# Patient Record
Sex: MALE | Race: WHITE
[De-identification: names, ages, dates, MRNs, and addresses within clinical notes are randomized per-mention and may not be internally consistent; named-entity substitution may affect disease eponyms.]

---

## 2017-12-11 NOTE — CT
CT ANGIOGRAM ABDOMEN WITH AND WITHOUT IV CONTRAST AND 3D RECONSTRUCTIONS:

 

Date: 12-11-17

 

History: Follow up abdominal aortic aneurysm. 

 

Comparison: None available. 

 

FINDINGS: 

Vascular calcifications are seen in the visualized coronary arteries with evidence of prior CABG. Vas
cular calcifications also seen in the abdominal aorta and involving the iliac arteries. There is an i
nfrarenal abdominal aortic aneurysm with greatest axial dimensions of 6.5 cm AP and 5.7 cm transverse
. The craniocaudal dimensions of the aneurysm is approximately 6.4 cm. There is essentric plaque with
in the aneurysm. The aneurysm extends to just above the level of the aortic bifurcation and does not 
involving the iliac arteries. There is no evidence of an aortic dissection. 

 

There are two patent right renal arteries with single patent left renal artery. The celiac and superi
or mesenteric arteries are patent with very mild narrowing at the origin of the celiac artery related
 to an essentric atherosclerotic calcification. 

 

There is bibasilar atelectasis. 

 

The liver, spleen, pancreas, bilateral adrenal glands and kidneys demonstrate a normal CT appearance 
for arterial phase of imaging. 

 

No periaortic fluid collection is seen. 

 

Degenerative changes are noted in the spine. 

 

IMPRESSION: 

Infrarenal abdominal aortic aneurysm with maximal AP dimension of 6.5 cm. This aneurysm extends to th
e distal infrarenal abdominal aorta but does not involve the iliac arteries. Diameter of the abdomina
l aorta at the level of the renal arteries is 2.8 cm. 

 

POS: [facility]

## 2018-01-30 NOTE — OP
DATE OF PROCEDURE:  01/30/2018

 

PREOPERATIVE DIAGNOSIS:  Abdominal aortic aneurysm with 2 large accessory renal emanating from the cuba
dy of the aneurysm.

 

POSTOPERATIVE DIAGNOSIS:  Abdominal aortic aneurysm with 2 large accessory renal emanating from the b
ulysses of the aneurysm.

 

PROCEDURES:

1.  Abdominal aortogram.

2.  Selective superior left accessory renal artery angiogram.

3.  Selective inferior left renal artery angiogram.

4.  Embolization of the left superior renal artery with 4 x 8 interlock coils x2.

5.  Selective embolization of the left inferior renal artery with 5 x 80 interlock coils x2.

5.  ProGlide closure.

6.  Ultrasound guided arterial access.

 

TOTAL CONTRAST:  101 mL

 

FLUORO TIME:  29.8 minutes.

 

ANESTHESIA:  1 mg of Versed and 25 mcg of fentanyl for IV sedation.

 

DESCRIPTION OF PROCEDURE:  After operative consent was obtained, the patient was brought to the cath 
lab and placed in supine position on the cath lab table.  Appropriate anesthetic monitor was placed. 
 IV sedation was begun.  The right groin was prepped and draped in the usual sterile fashion.  Using 
ultrasound guidance, the area over the femoral artery was anesthetized with 1% lidocaine.  Percutaneo
us access to the common femoral artery was obtained using ultrasound guidance.  A 5 Welsh sheath was
 placed.  The pigtail catheter was placed in the abdominal aorta.  Multiple plane views of the abdomi
nal aorta were performed using both hand injected technique and a power injector.  Once the two acces
josy renal arteries were localized and an appropriate angled view of the x-ray had obtained, the pigt
ail catheter was removed over an angled Glidewire.  A LIMA catheter was then used to cannulate the davidson
perior accessory renal artery.  The Glidewire past into the renal artery.  The LIMA catheter was exch
anged for a Canton catheter.  Hand injected arteriogram was performed through the Canton and confir
gerard the intraluminal location.  A renegade microcatheter was then passed into the Canton catheter a
nd through that two 4 x 80 interlock coils were placed.  Hand injected arteriogram performed good pos
itioning.  The angled Glidewire was then replaced.  The LIMA catheter was then used to cannulate the 
inferior accessory renal artery.  Similarly, the similar to the superior accessory renal, the angled 
Glidewire was passed deep into the accessory renal artery.  Canton catheter was then placed and hand
 injected arteriogram performed confirming intraluminal location.  Microcatheter was then placed and 
through the microcatheter two 5 x 80 interlock coils were placed.  Followup aortogram showed good pos
itioning of both the coils with the superior accessory renal already being thrombosed.  The Bentson g
uidewire was then replaced and ProGlide was deployed and good hemostasis.  The patient was transferre
d to the recovery room, will be kept for two hours prior to being discharged home today.  The aneurys
m will need to be repaired in the near future.

## 2018-02-15 ENCOUNTER — HOSPITAL ENCOUNTER (INPATIENT)
Dept: HOSPITAL 92 - SURG A | Age: 78
LOS: 1 days | Discharge: HOME | DRG: 269 | End: 2018-02-16
Attending: THORACIC SURGERY (CARDIOTHORACIC VASCULAR SURGERY) | Admitting: THORACIC SURGERY (CARDIOTHORACIC VASCULAR SURGERY)
Payer: MEDICARE

## 2018-02-15 VITALS — BODY MASS INDEX: 34 KG/M2

## 2018-02-15 VITALS — SYSTOLIC BLOOD PRESSURE: 138 MMHG | DIASTOLIC BLOOD PRESSURE: 63 MMHG

## 2018-02-15 DIAGNOSIS — I10: ICD-10-CM

## 2018-02-15 DIAGNOSIS — I71.4: Primary | ICD-10-CM

## 2018-02-15 DIAGNOSIS — K21.9: ICD-10-CM

## 2018-02-15 DIAGNOSIS — Z95.1: ICD-10-CM

## 2018-02-15 DIAGNOSIS — E78.00: ICD-10-CM

## 2018-02-15 DIAGNOSIS — I25.10: ICD-10-CM

## 2018-02-15 LAB
ANION GAP SERPL CALC-SCNC: 11 MMOL/L (ref 10–20)
BUN SERPL-MCNC: 26 MG/DL (ref 8.4–25.7)
CALCIUM SERPL-MCNC: 8.9 MG/DL (ref 7.8–10.44)
CHLORIDE SERPL-SCNC: 108 MMOL/L (ref 98–107)
CO2 SERPL-SCNC: 27 MMOL/L (ref 23–31)
CREAT CL PREDICTED SERPL C-G-VRATE: 121 ML/MIN (ref 70–130)
GLUCOSE SERPL-MCNC: 124 MG/DL (ref 83–110)
HGB BLD-MCNC: 12.6 G/DL (ref 14–18)
MCH RBC QN AUTO: 32.6 PG (ref 27–31)
MCV RBC AUTO: 95.5 FL (ref 80–94)
PLATELET # BLD AUTO: 205 THOU/UL (ref 130–400)
POTASSIUM SERPL-SCNC: 3.7 MMOL/L (ref 3.5–5.1)
RBC # BLD AUTO: 3.87 MILL/UL (ref 4.7–6.1)
SODIUM SERPL-SCNC: 142 MMOL/L (ref 136–145)
WBC # BLD AUTO: 7 THOU/UL (ref 4.8–10.8)

## 2018-02-15 PROCEDURE — 86922 COMPATIBILITY TEST ANTIGLOB: CPT

## 2018-02-15 PROCEDURE — 04V03D6: ICD-10-PCS | Performed by: THORACIC SURGERY (CARDIOTHORACIC VASCULAR SURGERY)

## 2018-02-15 PROCEDURE — C1760 CLOSURE DEV, VASC: HCPCS

## 2018-02-15 PROCEDURE — C1726 CATH, BAL DIL, NON-VASCULAR: HCPCS

## 2018-02-15 PROCEDURE — 86900 BLOOD TYPING SEROLOGIC ABO: CPT

## 2018-02-15 PROCEDURE — 80048 BASIC METABOLIC PNL TOTAL CA: CPT

## 2018-02-15 PROCEDURE — 85025 COMPLETE CBC W/AUTO DIFF WBC: CPT

## 2018-02-15 PROCEDURE — 36415 COLL VENOUS BLD VENIPUNCTURE: CPT

## 2018-02-15 PROCEDURE — C1894 INTRO/SHEATH, NON-LASER: HCPCS

## 2018-02-15 PROCEDURE — 86870 RBC ANTIBODY IDENTIFICATION: CPT

## 2018-02-15 PROCEDURE — C1769 GUIDE WIRE: HCPCS

## 2018-02-15 PROCEDURE — 86850 RBC ANTIBODY SCREEN: CPT

## 2018-02-15 PROCEDURE — 85027 COMPLETE CBC AUTOMATED: CPT

## 2018-02-15 PROCEDURE — 76001: CPT

## 2018-02-15 PROCEDURE — 86901 BLOOD TYPING SEROLOGIC RH(D): CPT

## 2018-02-15 RX ADMIN — Medication SCH GM: at 15:29

## 2018-02-15 RX ADMIN — Medication SCH GM: at 23:55

## 2018-02-15 NOTE — OP
DATE OF PROCEDURE:  02/15/2018

 

PREOPERATIVE DIAGNOSIS:  Abdominal aortic aneurysm.

 

POSTOPERATIVE DIAGNOSIS:  Abdominal aortic aneurysm.

 

PROCEDURE:

1.  Preclose technique using bilateral crossed Proglides x2.

2.  Ultrasound guided percutaneous access.

3.  Endovascular aneurysm repair with Med[employer] Endurant II system 

   32 x 16 x 166 bifurcation graft through the left groin 

   16 x 16 x 93 right iliac extension

   16 x 16 x 82 right iliac extension

 

SURGEON:  Dr. Vinay Coleman and Dr. Mode Muhammad

 

ANESTHESIA:  General endotracheal.

 

ESTIMATED BLOOD LOSS:  Less than 100 mL.

 

TOTAL CONTRAST:  90 mL.

 

TOTAL FLUOROSCOPY TIME:  11 minutes 25 seconds.

 

DESCRIPTION OF PROCEDURE:  After operative consent was obtained, the patient 
was brought to the operating room and placed in the supine position on the 
operating room table.  Appropriate anesthetic monitor was placed and general 
endotracheal anesthesia induced.  Groins were prepped and draped in usual 
sterile fashion.  Using ultrasound guidance, percutaneous access to the common 
femoral arteries was obtained.  A 5 Egyptian sheath was placed over a Bentson 
wire.  A crossed ProGlide closure sutures were then placed and clamped 
bilaterally.  The patient was given 7500 units of heparin followed by 2500 
units later in the case to maintain an ACT of over 230.

 

On the left, the Bentson guidewire was exchanged for a Lunderquist guidewire.  
On the right, the Bentson guidewire was exchanged for a Lunderquist guidewire 
and a 12-Egyptian sheath placed in the aneurysm itself.  The Contra catheter was 
then placed in the upper abdominal aorta.  Aortogram was performed illuminating 
the aorta and renals.  Our graft was selected, which was a 32 x 16 x 166.  This 
was loaded on the left side of the Lunderquist graft, taken up to the level of 
the renal arteries and deployment begun.

 

A second magnified aortogram was performed illuminating the renal arteries.  
The graft was positioned appropriately just at the renal bifurcation and 
deployed down to the short gate.  The Contra catheter was withdrawn back into 
the sheath and the sheath brought back underneath the short gate.  Contra 
catheter and Lunderquist wire were used to cannulate the short gate.  
Lunderquist was brought up into the upper abdominal aorta.  The Contra catheter 
was brought into the graft itself and twirled.  It twirled freely.  Contra 
catheter was then used to measure with hand injected right iliac angiogram.  
The iliac bifurcation was noted and marked.  A 16 x 16 x 93 extension graft was 
selected and deployed in the right iliac.  This ended up approximately 2 cm 
short of our otto and we extended it with a second 16 x 16 graft.  Reliant 
balloons were used to fully deploy the graft for its full length.  Aortogram 
was again performed showing no evidence of type 1 endoleak.  The graft material 
itself was intact.  There was late type 2 endoleak from lumbar arteries.  The 
previously embolized accessory renals were thrombosed.  Lunderquist guidewires 
were exchanged for Bentson guidewires. 



Sheaths were removed and Proglides deployed with good hemostasis. Heparin was 
reversed with 75 mg of Protamine.  Dermabond was applied to the skin and the 
patient was awakened, extubated, and transferred to recovery room in stable 
condition.

 

DRISS

## 2018-02-16 VITALS — TEMPERATURE: 97.9 F

## 2018-02-16 LAB
ANION GAP SERPL CALC-SCNC: 12 MMOL/L (ref 10–20)
BASOPHILS # BLD AUTO: 0 THOU/UL (ref 0–0.2)
BASOPHILS NFR BLD AUTO: 0.1 % (ref 0–1)
BUN SERPL-MCNC: 18 MG/DL (ref 8.4–25.7)
CALCIUM SERPL-MCNC: 9 MG/DL (ref 7.8–10.44)
CHLORIDE SERPL-SCNC: 106 MMOL/L (ref 98–107)
CO2 SERPL-SCNC: 23 MMOL/L (ref 23–31)
CREAT CL PREDICTED SERPL C-G-VRATE: 137 ML/MIN (ref 70–130)
EOSINOPHIL # BLD AUTO: 0 THOU/UL (ref 0–0.7)
EOSINOPHIL NFR BLD AUTO: 0.3 % (ref 0–10)
GLUCOSE SERPL-MCNC: 140 MG/DL (ref 83–110)
HGB BLD-MCNC: 12.2 G/DL (ref 14–18)
LYMPHOCYTES # BLD: 0.9 THOU/UL (ref 1.2–3.4)
LYMPHOCYTES NFR BLD AUTO: 7.8 % (ref 21–51)
MCH RBC QN AUTO: 31.8 PG (ref 27–31)
MCV RBC AUTO: 94.6 FL (ref 80–94)
MONOCYTES # BLD AUTO: 0.7 THOU/UL (ref 0.11–0.59)
MONOCYTES NFR BLD AUTO: 5.7 % (ref 0–10)
NEUTROPHILS # BLD AUTO: 10.3 THOU/UL (ref 1.4–6.5)
NEUTROPHILS NFR BLD AUTO: 86.1 % (ref 42–75)
PLATELET # BLD AUTO: 176 THOU/UL (ref 130–400)
POTASSIUM SERPL-SCNC: 3.9 MMOL/L (ref 3.5–5.1)
RBC # BLD AUTO: 3.84 MILL/UL (ref 4.7–6.1)
SODIUM SERPL-SCNC: 137 MMOL/L (ref 136–145)
WBC # BLD AUTO: 11.9 THOU/UL (ref 4.8–10.8)

## 2018-02-16 RX ADMIN — Medication SCH GM: at 07:51

## 2018-02-16 NOTE — DIS
DATE OF ADMISSION:  02/15/2018

 

DATE OF DISCHARGE:  02/16/2018

 

DIAGNOSIS:  Abdominal aortic aneurysm.

 

PROCEDURE:  Endovascular repair of abdominal aortic aneurysm.

 

DESCRIPTION OF HOSPITAL STAY:  Mr. Mantilla underwent elective repair of his aneurysm.  He has done w
ell and is being discharged to home to follow up with me in 2 weeks.

 

DISCHARGE MEDICATIONS:  Unchanged.

## 2018-03-11 ENCOUNTER — HOSPITAL ENCOUNTER (INPATIENT)
Dept: HOSPITAL 92 - ERS | Age: 78
LOS: 9 days | Discharge: HOME | DRG: 274 | End: 2018-03-20
Attending: FAMILY MEDICINE | Admitting: FAMILY MEDICINE
Payer: MEDICARE

## 2018-03-11 VITALS — BODY MASS INDEX: 34.4 KG/M2

## 2018-03-11 DIAGNOSIS — I25.10: ICD-10-CM

## 2018-03-11 DIAGNOSIS — I48.3: Primary | ICD-10-CM

## 2018-03-11 DIAGNOSIS — I10: ICD-10-CM

## 2018-03-11 DIAGNOSIS — I48.91: ICD-10-CM

## 2018-03-11 DIAGNOSIS — Z95.1: ICD-10-CM

## 2018-03-11 DIAGNOSIS — G47.33: ICD-10-CM

## 2018-03-11 DIAGNOSIS — J30.2: ICD-10-CM

## 2018-03-11 DIAGNOSIS — I24.8: ICD-10-CM

## 2018-03-11 DIAGNOSIS — Z86.79: ICD-10-CM

## 2018-03-11 DIAGNOSIS — E78.5: ICD-10-CM

## 2018-03-11 LAB
ALBUMIN SERPL BCG-MCNC: 3.9 G/DL (ref 3.4–4.8)
ALP SERPL-CCNC: 95 U/L (ref 40–150)
ALT SERPL W P-5'-P-CCNC: 15 U/L (ref 8–55)
ANION GAP SERPL CALC-SCNC: 12 MMOL/L (ref 10–20)
AST SERPL-CCNC: 15 U/L (ref 5–34)
BASOPHILS # BLD AUTO: 0 THOU/UL (ref 0–0.2)
BASOPHILS NFR BLD AUTO: 0.7 % (ref 0–1)
BILIRUB SERPL-MCNC: 1 MG/DL (ref 0.2–1.2)
BUN SERPL-MCNC: 29 MG/DL (ref 8.4–25.7)
CALCIUM SERPL-MCNC: 8.9 MG/DL (ref 7.8–10.44)
CHLORIDE SERPL-SCNC: 98 MMOL/L (ref 98–107)
CK MB SERPL-MCNC: 11.6 NG/ML (ref 0–6.6)
CK MB SERPL-MCNC: 9.2 NG/ML (ref 0–6.6)
CO2 SERPL-SCNC: 26 MMOL/L (ref 23–31)
CREAT CL PREDICTED SERPL C-G-VRATE: 0 ML/MIN (ref 70–130)
EOSINOPHIL # BLD AUTO: 0.4 THOU/UL (ref 0–0.7)
EOSINOPHIL NFR BLD AUTO: 6 % (ref 0–10)
GLOBULIN SER CALC-MCNC: 2.5 G/DL (ref 2.4–3.5)
GLUCOSE SERPL-MCNC: 127 MG/DL (ref 83–110)
HGB BLD-MCNC: 13.8 G/DL (ref 14–18)
LYMPHOCYTES # BLD: 1.7 THOU/UL (ref 1.2–3.4)
LYMPHOCYTES NFR BLD AUTO: 24.5 % (ref 21–51)
MCH RBC QN AUTO: 32.4 PG (ref 27–31)
MCV RBC AUTO: 93.1 FL (ref 80–94)
MONOCYTES # BLD AUTO: 0.7 THOU/UL (ref 0.11–0.59)
MONOCYTES NFR BLD AUTO: 10.1 % (ref 0–10)
NEUTROPHILS # BLD AUTO: 4 THOU/UL (ref 1.4–6.5)
NEUTROPHILS NFR BLD AUTO: 58.7 % (ref 42–75)
PLATELET # BLD AUTO: 205 THOU/UL (ref 130–400)
POTASSIUM SERPL-SCNC: 3.7 MMOL/L (ref 3.5–5.1)
RBC # BLD AUTO: 4.27 MILL/UL (ref 4.7–6.1)
SODIUM SERPL-SCNC: 132 MMOL/L (ref 136–145)
TROPONIN I SERPL DL<=0.01 NG/ML-MCNC: 0.38 NG/ML (ref ?–0.03)
TROPONIN I SERPL DL<=0.01 NG/ML-MCNC: 0.6 NG/ML (ref ?–0.03)
TROPONIN I SERPL DL<=0.01 NG/ML-MCNC: 0.67 NG/ML (ref ?–0.03)
WBC # BLD AUTO: 6.8 THOU/UL (ref 4.8–10.8)

## 2018-03-11 PROCEDURE — 85610 PROTHROMBIN TIME: CPT

## 2018-03-11 PROCEDURE — 78452 HT MUSCLE IMAGE SPECT MULT: CPT

## 2018-03-11 PROCEDURE — 93621 COMP EP EVL L PAC&REC C SINS: CPT

## 2018-03-11 PROCEDURE — 80053 COMPREHEN METABOLIC PANEL: CPT

## 2018-03-11 PROCEDURE — 85025 COMPLETE CBC W/AUTO DIFF WBC: CPT

## 2018-03-11 PROCEDURE — 85730 THROMBOPLASTIN TIME PARTIAL: CPT

## 2018-03-11 PROCEDURE — 82553 CREATINE MB FRACTION: CPT

## 2018-03-11 PROCEDURE — 76942 ECHO GUIDE FOR BIOPSY: CPT

## 2018-03-11 PROCEDURE — 80048 BASIC METABOLIC PNL TOTAL CA: CPT

## 2018-03-11 PROCEDURE — 93623 PRGRMD STIMJ&PACG IV RX NFS: CPT

## 2018-03-11 PROCEDURE — 93005 ELECTROCARDIOGRAM TRACING: CPT

## 2018-03-11 PROCEDURE — A4216 STERILE WATER/SALINE, 10 ML: HCPCS

## 2018-03-11 PROCEDURE — 84443 ASSAY THYROID STIM HORMONE: CPT

## 2018-03-11 PROCEDURE — 93613 INTRACARDIAC EPHYS 3D MAPG: CPT

## 2018-03-11 PROCEDURE — C1769 GUIDE WIRE: HCPCS

## 2018-03-11 PROCEDURE — 93010 ELECTROCARDIOGRAM REPORT: CPT

## 2018-03-11 PROCEDURE — C1730 CATH, EP, 19 OR FEW ELECT: HCPCS

## 2018-03-11 PROCEDURE — 85652 RBC SED RATE AUTOMATED: CPT

## 2018-03-11 PROCEDURE — A9500 TC99M SESTAMIBI: HCPCS

## 2018-03-11 PROCEDURE — 83880 ASSAY OF NATRIURETIC PEPTIDE: CPT

## 2018-03-11 PROCEDURE — 93653 COMPRE EP EVAL TX SVT: CPT

## 2018-03-11 PROCEDURE — 93017 CV STRESS TEST TRACING ONLY: CPT

## 2018-03-11 PROCEDURE — 93567 NJX CAR CTH SPRVLV AORTGRPHY: CPT

## 2018-03-11 PROCEDURE — 93306 TTE W/DOPPLER COMPLETE: CPT

## 2018-03-11 PROCEDURE — 93798 PHYS/QHP OP CAR RHAB W/ECG: CPT

## 2018-03-11 PROCEDURE — 93459 L HRT ART/GRFT ANGIO: CPT

## 2018-03-11 PROCEDURE — 84484 ASSAY OF TROPONIN QUANT: CPT

## 2018-03-11 PROCEDURE — 83735 ASSAY OF MAGNESIUM: CPT

## 2018-03-11 PROCEDURE — 36415 COLL VENOUS BLD VENIPUNCTURE: CPT

## 2018-03-11 PROCEDURE — 71045 X-RAY EXAM CHEST 1 VIEW: CPT

## 2018-03-11 NOTE — CON
DATE OF CONSULTATION:  03/11/2018

 

REASON FOR CONSULTATION:  Atrial fibrillation.

 

HISTORY OF PRESENT ILLNESS:  Mr. Mantilla is a very pleasant 77-year-old gentleman, who is a patient 
of Dr. Buck Jauregui.  He recently presented with palpitations.  He states he was in normal state of heal
th.  When his monitor noticed, his heart rate was in the 120s.  He did have associated palpitations. 
 No chest pain, pressure, or other associated symptoms.  He was seen and evaluated in the emergency r
oom where he was found to be in atrial fibrillation.  Upon my evaluation, he was back in sinus rhythm
.

 

PAST MEDICAL AND SURGICAL HISTORY:  Hypertension, CAD, status post bypass surgery, recent aortic aneu
rysm repair _____ percutaneously.

 

CURRENT HOME MEDICATIONS:  Amlodipine, aspirin, carvedilol, fish oil, folic acid, Lasix, isosorbide, 
atorvastatin, lisinopril and Plavix.

 

REVIEW OF SYSTEMS:  Ten-point review of systems is reviewed and as above, otherwise negative.

 

PHYSICAL EXAMINATION:

GENERAL:  Patient is a pleasant male who is in no acute distress.  The patient appears his stated age
.

VITAL SIGNS:  Blood pressure 146/85, pulse 74 and temperature 97.8.

NEUROLOGIC:  The patient is alert and oriented x3 with no focal neurologic deficits.

HEENT:  Sclerae without icterus.  Mouth has moist mucous membranes with normal pallor.

NECK:  No JVD.  Carotid upstroke brisk.  No bruits bilaterally.

LUNGS:  Clear to auscultation with unlabored respirations.

BACK:  No scoliosis or kyphosis.

CARDIAC:  Regular rate and rhythm with normal S1 and S2.  No S3 or S4 noted.  No significant rubs, mu
rmurs, thrills, or gallops noted throughout the precordium.  PMI is not displaced.  There is no paulo
ternal heave.

ABDOMEN:  Soft, nontender, nondistended.  No peritoneal signs present.  No hepatosplenomegaly.  No ab
normal striae.

EXTREMITIES:  2+ femoral and 2+ dorsalis pedis pulses.  No cyanosis, clubbing, or edema.

SKIN:  No gross abnormalities.

 

PERTINENT LABORATORY DATA:  Hemoglobin 13.8.   and troponin 0.3.

 

IMPRESSION:

1.  Atrial fibrillation.

2.  Status post percutaneous endovascular repair of abdominal aortic aneurysm.

3.  Coronary artery disease.

4.  Status post bypass surgery.

5.  Elevated troponin.

 

RECOMMENDATIONS:  Mr. Mantilla had no current symptoms suggesting angina.  He did undergo abdominal a
ortic aneurysm recently.  He has converted back to sinus rhythm.  At this point, would recommend Mult
aq.  His last echo dated 2016 suggested LVEF of 50% to 55%.  Would recommend repeating his echo.  Kofi garcia, I have no recommendations.

## 2018-03-11 NOTE — PDOC.FPRHP
- History of Present Illness


Chief Complaint: palpitations


History of Present Illness: 





Patient comes in after noticing his HR was elevated last night and then had not 

resolved this morning. He has pertinent history of 2 CABG procedure (latest 16 

years ago), Triple AAA repair 2/15/18, HTN, and CLAY. Patient denies any chest 

pain. He states he was feeling weak 3 days ago so he held his lasix since that 

time. He denies SOB or cough. 


ED Course: 





CXR shows cardiomegaly, CT-PE protocol shows no PE


trop 0.15 -> .37


Patient recieved 2 doses of adenosine at outside ED, had not converted, upon 

presentation to this ED patient was tachycardic but then spontaneously 

converted to rhythm in the 70s





- Allergies/Adverse Reactions


 Allergies











Allergy/AdvReac Type Severity Reaction Status Date / Time


 


hydrochlorothiazide Allergy   Verified 02/15/18 07:00














- Home Medications


 











 Medication  Instructions  Recorded  Confirmed  Type


 


Amlodipine [Norvasc] 10 mg PO QPM 01/26/18 03/11/18 History


 


Ascorbic Acid [Vitamin C] 500 mg PO BID 01/26/18 03/11/18 History


 


Aspirin [Aspirin EC] 81 mg PO QAM 01/26/18 03/11/18 History


 


Atorvastatin Calcium [Lipitor] 1 tab PO QPM 01/26/18 03/11/18 History


 


Carvedilol [Coreg] 1 tab PO QAM 01/26/18 03/11/18 History


 


Cetirizine HCl [Zyrtec] 10 mg PO QAM 01/26/18 03/11/18 History


 


Cholecalciferol (Vitamin D3) 1,000 unit PO BID 01/26/18 03/11/18 History





[Vitamin D]    


 


Clopidogrel Bisulfate [Clopidogrel] 75 mg PO QPM 01/26/18 03/11/18 History


 


Fish Oil/Borage/Flax/Om3,6,9 1 3 cap PO BID 01/26/18 03/11/18 History





[Omega 3-6-9 1,200 mg Softgel]    


 


Folic Acid 0.8 mg PO BID 01/26/18 03/11/18 History


 


Furosemide 40 mg PO QAM 01/26/18 03/11/18 History


 


Isosorbide Mononitrate [Imdur ER] 30 mg PO QAM 01/26/18 03/11/18 History


 


Lisinopril 20 mg PO BID 01/26/18 03/11/18 History


 


Naproxen Sodium [Aleve] 220 mg PO BID 01/26/18 03/11/18 History


 


Ubidecarenone [Co Q-10] 100 mg PO QPM 01/26/18 03/11/18 History


 


Amlodipine [Norvasc] 5 mg PO QAM 03/11/18 03/11/18 History


 


Carvedilol [Coreg] 2 tab PO QPM 03/11/18 03/11/18 History


 


Docusate [Colace] 2 cap PO DAILY PRN 03/11/18 03/11/18 History


 


Fluticasone Propionate [Flonase 2 spray EA NARE DAILY 03/11/18 03/11/18 History





Nasal Spray]    














- History


PMHx: HTN, CAD, CABG x 2 (latest 16 yrs ago), CLAY


 


PSHx: AAA repair 2/15/18





FHx: mother bypass


 


Social: chewing tobacco 40 years, no alcohol, no drugs


 








- Review of Systems


General: denies: fever/chills, night sweats


Eyes: denies: eye pain, vision changes


ENT: denies: nasal congestion, rhinorrhea


Respiratory: denies: cough, shortness of breath


Cardiovascular: reports: palpitation, edema.  denies: chest pain


Gastrointestinal: denies: nausea, vomiting, diarrhea


Genitourinary: denies: dysuria, polyuria


Skin: denies: rashes, itching


Musculoskeletal: denies: pain, tenderness


Neurological: denies: numbness, weakness


Psychological: denies: anxiety, depression





- Vital signs


BP: [111/81]  HR: [122 on presentation, 73 on admit] RR: [12] Tmax: [97.5] Pox: 

[95]% on [ra]  Wt: [121kg]   








- Physical Exam


Constitutional: NAD, awake, alert and oriented


HEENT: normocephalic and atraumatic, PERRLA, MMM


Neck: supple, FROM


Heart: normal S1/S2


-Heart: 





irregularly irregular rhythm, 2/6 systolic murmur


Lungs: good air movement


-Lungs: 





no distress, mild decreased breath sounds at the bases


Abdomen: soft, non-tender, bowel sounds present


Musculoskeletal: normal structure, ROM grossly normal


Neurological: no focal deficit, normal sensation


Skin: no rash/lesions, capillary refill <2 seconds


Heme/Lymphatic: no unusual bruising or bleeding


Psychiatric: normal mood and affect, good judgment and insight





FMR H&P: Results





- Labs


Result Diagrams: 


 03/11/18 13:23





 03/11/18 13:23


Lab results: 


 











WBC  6.8 thou/uL (4.8-10.8)   03/11/18  13:23    


 


Hgb  13.8 g/dL (14.0-18.0)  L  03/11/18  13:23    


 


Hct  39.7 % (42.0-52.0)  L  03/11/18  13:23    


 


MCV  93.1 fl (80.0-94.0)   03/11/18  13:23    


 


Plt Count  205 thou/uL (130-400)   03/11/18  13:23    


 


Neutrophils %  58.7 % (42.0-75.0)   03/11/18  13:23    


 


Sodium  132 mmol/L (136-145)  L  03/11/18  13:23    


 


Potassium  3.7 mmol/L (3.5-5.1)   03/11/18  13:23    


 


Chloride  98 mmol/L ()   03/11/18  13:23    


 


Carbon Dioxide  26 mmol/L (23-31)   03/11/18  13:23    


 


BUN  29 mg/dL (8.4-25.7)  H  03/11/18  13:23    


 


Creatinine  0.92 mg/dL (0.6-1.3)   03/11/18  13:23    


 


Glucose  127 mg/dL ()  H  03/11/18  13:23    


 


Calcium  8.9 mg/dL (7.8-10.44)   03/11/18  13:23    


 


Total Bilirubin  1.0 mg/dL (0.2-1.2)   03/11/18  13:23    


 


AST  15 U/L (5-34)   03/11/18  13:23    


 


ALT  15 U/L (8-55)   03/11/18  13:23    


 


Alkaline Phosphatase  95 U/L ()   03/11/18  13:23    


 


B-Natriuretic Peptide  164.4 pg/mL (0-100)  H  03/11/18  13:23    


 


Serum Total Protein  6.4 g/dL (5.8-8.1)   03/11/18  13:23    


 


Albumin  3.9 g/dL (3.4-4.8)   03/11/18  13:23    














- EKG Interpretation


EKG: 





EKG is abnormal. Shows A fib. EKG was discussed with Dr. Thomas.





FMR H&P: A/P





- Problem List


(1) Elevated troponin


Current Visit: Yes   Status: Acute   Code(s): R74.8 - ABNORMAL LEVELS OF OTHER 

SERUM ENZYMES   





(2) HTN (hypertension)


Current Visit: Yes   Status: Acute   Code(s): I10 - ESSENTIAL (PRIMARY) 

HYPERTENSION   





(3) AAA (abdominal aortic aneurysm)


Current Visit: Yes   Status: Acute   Code(s): I71.4 - ABDOMINAL AORTIC ANEURYSM

, WITHOUT RUPTURE   





(4) CLAY (obstructive sleep apnea)


Current Visit: Yes   Status: Acute   Code(s): G47.33 - OBSTRUCTIVE SLEEP APNEA (

ADULT) (PEDIATRIC)   





(5) Tachycardia


Current Visit: Yes   Status: Acute   Code(s): R00.0 - TACHYCARDIA, UNSPECIFIED 

  





(6) Abnormal EKG


Current Visit: Yes   Status: Acute   Code(s): R94.31 - ABNORMAL 

ELECTROCARDIOGRAM [ECG] [EKG]   





- Plan





# Tachycardia


- 2 doses Adenosine OSH


- rate in 70s on admit


- Tele


- carvedilol 6.125


- Dr. Thomas consulted, recs appreciated


- consider stress test, echo in the AM





# Elevated troponin


- 0.15 -> 0.38


- trend


- CTA neg for PE at OSH





# HTN


- home meds





# HLD


- high intesity statin





# recent AAA repair


- cautious with anticoagulation





# CLAY


- cpap at night





#PPx


- lovenox, scds





# Code


- full





FMR H&P: Upper Level





- Pertinent history


78yo CM with pmhx CAD s/p 4v CABG x 2, AAA w/o rupture s/p repair 1 mo prior, 

HTN, HLD & Atherosclerosis who presents with 12+ hr history of palpitations & 

heart racing. Pt was at granddaughter's wedding last night, got back to hotel 

and felt heart beating fast, checked pulse and found to be 117. Went to bed and 

woke up in the morning with persistent symptoms and went to [facility] [place] ED. 

Found to be in Aflutter to rate 120s, given adenosine x 2 and IVF without 

conversion; however upon arrival to ED at [facility] converted to rate controlled 

Afib spontaneously (rate in 70s). 


Pt has h/o paroxysmal Afib in 2013 after ankle surgery, otherwise no recent 

Afib.


Does endorse mild SOB with palpitations, but have since resolved.





Endorses medication compliance and otherwise no symptoms. 





Cards- Dr. Jauregui


CV Surg- Dr. Muhammad





- Pertinent findings


PE-resting comfortably with NAD


irregularly irregular rhythm (rate in 70s), distant heart sounds with 2/6 VANI.


Lungs mostly clear aside from faint crackles in left lung base.


Trace LE edema (improved from baseline per family)





Labs-


trop in [place]- 0.17 --> 0.377 here








Tele- irregularly irregular rhythm with rate 71





CXR- cardiomegaly, with blunted costophrenic angles. post-sternotomy changes 

noted.





CTA in [place] neg











- Plan


Date/Time: 03/11/18 1730


78 yo CM with AAA s/p Repair, CAD s/p CABG x 2, HTN, HLD p/w--





1) New onset Aflutter- resolved, now afib with rate in 70s, however abn EKG 

with concerns for possible delta wave in precordial leads. consulted Dr. Thomas to acutely see pt. NAD and stable currently. Therapeutic lovenox for 

anticoagulation and monitor on tele. Continue home meds and will discuss tx 

with cards. Order echo. Trend CE's, check TSH & Mag. H/o recent stress per pt, 

therefore call Dr. Jauregui office in AM for results. 


2) CAD s/p CABG- maximize med therapy, consult cards for stress vs cath.


3) HTN- stable, home meds


4) HLD- cont high intensity statin


5) AAA s/p repair- inform CV surgery of pts inpt status.





I, [Araseli Sierra], have evaluated this patient and agree with findings/plan as 

outlined by intern resident. Pertinent changes/additions are listed here.








Attending Addendum





- Attending Addendum


Date/Time: 03/11/18 2033





I personally evaluated the patient and discussed the management with Dr. Valdez 

and Rigo.


I agree with the History, Examination, Assessment and Plan documented above 

with any addition or exceptions noted below.





LBBB, unable to compare to any previous.  Rising troponins.  Chest pain free.  

Await cardiology recommendations.  Maximize medical therapy.

## 2018-03-11 NOTE — RAD
PORTABLE CHEST:

 

Date:  03/11/18 

 

PROVIDED CLINICAL HISTORY:   

Atrial flutter. Dyspnea. 

 

FINDINGS:

 

No comparisons. 

 

Cardiac silhouette appears enlarged, which may be at least partially on the basis of portable techniq
ue. Median sternotomy changes are seen. No focal consolidation, pleural fluid, or pneumothorax appare
nt. 

 

IMPRESSION: 

No evidence for an acute cardiopulmonary process. 

 

 

POS: [facility]

## 2018-03-12 LAB
ANION GAP SERPL CALC-SCNC: 12 MMOL/L (ref 10–20)
BASOPHILS # BLD AUTO: 0.1 THOU/UL (ref 0–0.2)
BASOPHILS NFR BLD AUTO: 0.8 % (ref 0–1)
BUN SERPL-MCNC: 25 MG/DL (ref 8.4–25.7)
CALCIUM SERPL-MCNC: 9.3 MG/DL (ref 7.8–10.44)
CHLORIDE SERPL-SCNC: 95 MMOL/L (ref 98–107)
CK MB SERPL-MCNC: 6.5 NG/ML (ref 0–6.6)
CK MB SERPL-MCNC: 8.3 NG/ML (ref 0–6.6)
CO2 SERPL-SCNC: 29 MMOL/L (ref 23–31)
CREAT CL PREDICTED SERPL C-G-VRATE: 118 ML/MIN (ref 70–130)
EOSINOPHIL # BLD AUTO: 0.4 THOU/UL (ref 0–0.7)
EOSINOPHIL NFR BLD AUTO: 5.7 % (ref 0–10)
GLUCOSE SERPL-MCNC: 125 MG/DL (ref 83–110)
HGB BLD-MCNC: 13.2 G/DL (ref 14–18)
LYMPHOCYTES # BLD: 1.8 THOU/UL (ref 1.2–3.4)
LYMPHOCYTES NFR BLD AUTO: 24.5 % (ref 21–51)
MAGNESIUM SERPL-MCNC: 2 MG/DL (ref 1.6–2.6)
MCH RBC QN AUTO: 31.4 PG (ref 27–31)
MCV RBC AUTO: 90.6 FL (ref 80–94)
MONOCYTES # BLD AUTO: 0.8 THOU/UL (ref 0.11–0.59)
MONOCYTES NFR BLD AUTO: 10.2 % (ref 0–10)
NEUTROPHILS # BLD AUTO: 4.3 THOU/UL (ref 1.4–6.5)
NEUTROPHILS NFR BLD AUTO: 58.8 % (ref 42–75)
PLATELET # BLD AUTO: 207 THOU/UL (ref 130–400)
POTASSIUM SERPL-SCNC: 3.8 MMOL/L (ref 3.5–5.1)
RBC # BLD AUTO: 4.2 MILL/UL (ref 4.7–6.1)
SODIUM SERPL-SCNC: 132 MMOL/L (ref 136–145)
TROPONIN I SERPL DL<=0.01 NG/ML-MCNC: 0.62 NG/ML (ref ?–0.03)
TROPONIN I SERPL DL<=0.01 NG/ML-MCNC: 0.79 NG/ML (ref ?–0.03)
WBC # BLD AUTO: 7.4 THOU/UL (ref 4.8–10.8)

## 2018-03-12 RX ADMIN — ASPIRIN SCH MG: 81 TABLET ORAL at 08:19

## 2018-03-12 NOTE — PDOC.FM
- Subjective


Subjective: 





This morning the patient denies any chest pain, sob, or diaphoresis. He denies 

any episodes of palpitations overnight. There were no events noted on tele. 

Patient states he ate supper without any issues.





- Objective


Vital Signs & Weight: 


 Vital Signs (12 hours)











  Temp Pulse Resp BP BP Pulse Ox


 


 03/12/18 08:20   66    


 


 03/12/18 08:13  97.7 F  66  16   152/79 H  95


 


 03/12/18 05:50  97.6 F  68  19   149/85 H  95


 


 03/12/18 00:00  98.1 F  63  18   127/74  94 L


 


 03/11/18 21:41   68   142/80 H  


 


 03/11/18 21:40  98.1 F  68  18  142/80 H   97








 Weight











Weight                         121.472 kg














I&O: 


 











 03/11/18 03/12/18 03/13/18





 06:59 06:59 06:59


 


Intake Total  720 


 


Output Total  900 


 


Balance  -180 











Result Diagrams: 


 03/12/18 03:03





 03/12/18 03:03





<Jono Valdez - Last Filed: 03/12/18 08:48>





- Objective


Vital Signs & Weight: 


 Vital Signs (12 hours)











  Temp Pulse Resp BP Pulse Ox


 


 03/12/18 08:20   66   


 


 03/12/18 08:13  97.7 F  66  16  152/79 H  95


 


 03/12/18 05:50  97.6 F  68  19  149/85 H  95


 


 03/12/18 00:00  98.1 F  63  18  127/74  94 L








 Weight











Weight                         121.472 kg














I&O: 


 











 03/11/18 03/12/18 03/13/18





 06:59 06:59 06:59


 


Intake Total  720 


 


Output Total  900 


 


Balance  -180 











Result Diagrams: 


 03/12/18 03:03





 03/12/18 03:03





<Aaron Denny - Last Filed: 03/12/18 11:08>





Phys Exam





- Physical Examination


Constitutional: NAD


HEENT: PERRLA, moist MMs


Neck: no nodes, no JVD


Respiratory: no wheezing, clear to auscultation bilateral


irregularly irregular rhythm, systolic murmur 2/6


Gastrointestinal: soft, non-tender, no distention, positive bowel sounds


Musculoskeletal: no edema, pulses present


Neurological: non-focal, moves all 4 limbs


Lymphatic: no nodes


Psychiatric: normal affect, A&O x 3


Skin: no rash, cap refill <2 seconds





<Jono Valdez - Last Filed: 03/12/18 08:48>





Dx/Plan


(1) Elevated troponin


Code(s): R74.8 - ABNORMAL LEVELS OF OTHER SERUM ENZYMES   Status: Acute   





(2) HTN (hypertension)


Code(s): I10 - ESSENTIAL (PRIMARY) HYPERTENSION   Status: Acute   





(3) AAA (abdominal aortic aneurysm)


Code(s): I71.4 - ABDOMINAL AORTIC ANEURYSM, WITHOUT RUPTURE   Status: Acute   





(4) CLAY (obstructive sleep apnea)


Code(s): G47.33 - OBSTRUCTIVE SLEEP APNEA (ADULT) (PEDIATRIC)   Status: Acute   





(5) Tachycardia


Code(s): R00.0 - TACHYCARDIA, UNSPECIFIED   Status: Acute   





(6) Abnormal EKG


Code(s): R94.31 - ABNORMAL ELECTROCARDIOGRAM [ECG] [EKG]   Status: Acute   





- Plan


Plan: 





# Tachycardia


- 2 doses Adenosine OSH


- rate in 70s on admit


- Tele shows no events overnight


- TSH, Mag WNL


- carvedilol 6.125


- ASA, eliquis, plavix


- Dr. Thomas consulted, recs appreciated


   -recs maximal medical therapy


   - will await further recs





# Elevated troponin


- 0.15 -> 0.38 -> .603 -> .671 -> .785 -> .616


- CTA neg for PE at OSH





# HTN


- home meds


- imdur, lasix, lisinopril





# HLD


- high intesity statin





# recent AAA repair





# CLAY


- cpap at night





#PPx


- lovenox, scds





# Code


- full





<Jono Valdez - Last Filed: 03/12/18 08:48>





Attending Addendum





- Attending Addendum


Date/Time: 03/12/18 1107





I personally evaluated the patient and discussed the management with Dr. Valdez


I agree with the History, Examination, Assessment and Plan documented above 

with any addition or exceptions noted below.For stress testing tomorrow








<Aaron Denny - Last Filed: 03/12/18 11:08>

## 2018-03-12 NOTE — PQF
DATE:     3-12-18                                       ATTN:   DR. DARREN REEVES



Please exercise your independent, professional judgment in responding to the 
clarification form. 

Clinical indicators are provided on the bottom of this form for your review



Please check appropriate box(s):



[ x ]  Demand Ischemia

[  ]  MI (type:____________)

[  ] Other diagnosis ___________

[  ] Unable to determine



In addition, please specify:

Present on Admission (POA):  [  ] Yes             [  ] No             [  ] 
Unable to determine



CLINICAL INDICATORS - SIGNS / SYMPTOMS / LABS



TROPONIN:      3-11-18:   0.377,  0.603,   0.671

                        3-12-18:   0.785,  0.616



H&P:  ELEVATED TROPONIN



CONSULT NOTE  DR. CHOI 3-11-18:   A FIB, CAD,  S/P BYPASS SURGERY, 
ELEVATED TROPONIN

                                               

RISKS:  H&P:   HTN,  CAD,  CABG X2,  SMOKER,  ELEVATED TROPONIN, ABN EKG, 
TACHYCARDIA,   



TREATMENTS:  CARDIOLOGY CONSULT, CARDIAC MONITORING,  ECHO



(This form is maintained as a part of the permanent medical record)

 2015 [employer]  All Rights Reserved

EMBER [email or web address]    Office:  [phone]

                                                              

[facility] Center[facility]

## 2018-03-13 RX ADMIN — ASPIRIN SCH MG: 81 TABLET ORAL at 05:48

## 2018-03-13 NOTE — NM
CARDIAC SPECT:

 

HISTORY: 

A 77-year-old male with elevated troponin, coronary artery disease, CABG, stent, hypertension, dyslip
idemia.  

 

TECHNIQUE: 

A myocardial perfusion scan was performed using the single-isotope 1-day protocol with Technetium 99m
 sestamibi.  Nine mCi were injected intravenously for the rest exam followed by 27 mCi for the stress
 study.  Pharmacologic stress with adenosine is monitored and interpreted by Dr. Eid.

 

FINDINGS: 

Homogeneous tracer distribution is seen in the myocardial segments on stress and rest images without 
fixed or reversible defects.    

 

GATED SPECT LVEF:

40%.

 

WALL MOTION EXAM: 

Global hypokinesis.

 

IMPRESSION: 

No evidence of reversible ischemia.

 

POS: [facility]

## 2018-03-13 NOTE — PDOC.FM
- Subjective


Subjective: 





This morning the patient states he is feeling well. He denies any chest pain, 

palpitations, or SOB. He denies weakness or pain. He is anxious to return home.





- Objective


Vital Signs & Weight: 


 Vital Signs (12 hours)











  Temp Pulse Resp BP BP Pulse Ox


 


 03/13/18 07:59  97.9 F  61  16   117/67  97


 


 03/13/18 05:48     142/76 H  


 


 03/13/18 05:47   65   142/76 H  


 


 03/13/18 04:00  98.0 F  65  18   142/76 H 


 


 03/12/18 20:34     134/77  


 


 03/12/18 20:33   63   134/77  








 Weight











Weight                         120.656 kg














I&O: 


 











 03/12/18 03/13/18 03/14/18





 06:59 06:59 06:59


 


Intake Total 720 480 


 


Output Total 900 875 


 


Balance -180 -395 











Result Diagrams: 


 03/12/18 03:03





 03/12/18 03:03





<Jono Valdez - Last Filed: 03/13/18 08:11>





- Objective


Vital Signs & Weight: 


 Vital Signs (12 hours)











  Temp Pulse Resp BP BP Pulse Ox


 


 03/13/18 07:59  97.9 F  61  16   117/67  97


 


 03/13/18 05:48     142/76 H  


 


 03/13/18 05:47   65   142/76 H  


 


 03/13/18 04:00  98.0 F  65  18   142/76 H 








 Weight











Weight                         120.656 kg














I&O: 


 











 03/12/18 03/13/18 03/14/18





 06:59 06:59 06:59


 


Intake Total 720 480 


 


Output Total 900 875 


 


Balance -180 -395 











Result Diagrams: 


 03/12/18 03:03





 03/12/18 03:03





<Aaron Denny - Last Filed: 03/13/18 11:18>





Phys Exam





- Physical Examination


Constitutional: NAD


HEENT: PERRLA, moist MMs


Neck: no nodes, full ROM


Respiratory: no wheezing, clear to auscultation bilateral


Cardiovascular: RRR, no significant murmur


Gastrointestinal: soft, non-tender, no distention, positive bowel sounds


Musculoskeletal: pulses present


trace edema of the lower extremities


Neurological: non-focal, moves all 4 limbs


Psychiatric: normal affect, A&O x 3


Skin: no rash, cap refill <2 seconds





<Jono Valdez - Last Filed: 03/13/18 08:11>





Dx/Plan


(1) Elevated troponin


Code(s): R74.8 - ABNORMAL LEVELS OF OTHER SERUM ENZYMES   Status: Acute   





(2) HTN (hypertension)


Code(s): I10 - ESSENTIAL (PRIMARY) HYPERTENSION   Status: Acute   





(3) AAA (abdominal aortic aneurysm)


Code(s): I71.4 - ABDOMINAL AORTIC ANEURYSM, WITHOUT RUPTURE   Status: Acute   





(4) CLAY (obstructive sleep apnea)


Code(s): G47.33 - OBSTRUCTIVE SLEEP APNEA (ADULT) (PEDIATRIC)   Status: Acute   





(5) Tachycardia


Code(s): R00.0 - TACHYCARDIA, UNSPECIFIED   Status: Acute   





(6) Abnormal EKG


Code(s): R94.31 - ABNORMAL ELECTROCARDIOGRAM [ECG] [EKG]   Status: Acute   





- Plan


Plan: 





# Tachycardia


- 2 doses Adenosine OSH


- rate in 70s on admit


- Tele shows a few bigeminal PACs overnight, no other events


- TSH, Mag WNL


- carvedilol 6.125


- ASA, lovenox, plavix


- Dr. Thomas consulted, recs appreciated


   -recs stress test today, may start multaq





# Elevated troponin


- 0.15 -> 0.38 -> .603 -> .671 -> .785 -> .616


- CTA neg for PE at OSH





# HTN


- home meds


- imdur, lasix, lisinopril





# HLD


- high intesity statin





# recent AAA repair





# CLAY


- cpap at night





#PPx


- lovenox, scds





# Code


- full








<Jono Valdez - Last Filed: 03/13/18 08:11>





Attending Addendum





- Attending Addendum


Date/Time: 03/13/18 1118





I personally evaluated the patient and discussed the management with Dr. Valdez


I agree with the History, Examination, Assessment and Plan documented above 

with any addition or exceptions noted below.








<Aaron Denny - Last Filed: 03/13/18 11:18>

## 2018-03-14 LAB
CK MB SERPL-MCNC: 1 NG/ML (ref 0–6.6)
TROPONIN I SERPL DL<=0.01 NG/ML-MCNC: 0.27 NG/ML (ref ?–0.03)
TROPONIN I SERPL DL<=0.01 NG/ML-MCNC: 0.3 NG/ML (ref ?–0.03)
TROPONIN I SERPL DL<=0.01 NG/ML-MCNC: 0.33 NG/ML (ref ?–0.03)

## 2018-03-14 RX ADMIN — ASPIRIN SCH MG: 81 TABLET ORAL at 09:28

## 2018-03-14 NOTE — PDOC.FM
- Subjective


Subjective: 





This morning the patient states he is feeling well. He denies any chest pain or 

shortness of breath overnight. He denies any palpatations and states he slept 

well.





- Objective


Vital Signs & Weight: 


 Vital Signs (12 hours)











  Temp Pulse Resp BP BP Pulse Ox


 


 03/14/18 04:00  98.2 F  63  16   137/70  94 L


 


 03/13/18 20:13   60   137/68  


 


 03/13/18 20:12     137/68  


 


 03/13/18 19:55  97.0 F L  60  16    97


 


 03/13/18 19:45  97.5 F L  60  16   137/68  97








 Weight











Weight                         122.697 kg














I&O: 


 











 03/12/18 03/13/18 03/14/18





 06:59 06:59 06:59


 


Intake Total 720 480 600


 


Output Total 900 875 400


 


Balance -180 -395 200











Result Diagrams: 


 03/12/18 03:03





 03/12/18 03:03





<Jono Valdez - Last Filed: 03/14/18 12:40>





- Objective


Vital Signs & Weight: 


 Vital Signs (12 hours)











  Temp Pulse Resp BP BP Pulse Ox


 


 03/14/18 11:58  97.5 F L  75  18  121/69   97


 


 03/14/18 08:08  98.4 F  68  19   132/63  96


 


 03/14/18 04:00  98.2 F  63  16  137/70   94 L








 Weight











Weight                         119.884 kg














I&O: 


 











 03/13/18 03/14/18 03/15/18





 06:59 06:59 06:59


 


Intake Total 480 600 


 


Output Total 875 1025 


 


Balance -395 -425 











Result Diagrams: 


 03/12/18 03:03





 03/12/18 03:03





<Aaron Denny - Last Filed: 03/14/18 12:58>





Phys Exam





- Physical Examination


Constitutional: NAD


HEENT: PERRLA, moist MMs


Neck: no nodes, full ROM


Respiratory: no wheezing, clear to auscultation bilateral


Cardiovascular: no significant murmur


Gastrointestinal: soft, non-tender, no distention, positive bowel sounds


Musculoskeletal: no edema, pulses present


Neurological: non-focal, moves all 4 limbs


Psychiatric: normal affect, A&O x 3


Skin: no rash, cap refill <2 seconds





<Jono Valdez - Last Filed: 03/14/18 12:40>





Dx/Plan


(1) Elevated troponin


Code(s): R74.8 - ABNORMAL LEVELS OF OTHER SERUM ENZYMES   Status: Acute   





(2) HTN (hypertension)


Code(s): I10 - ESSENTIAL (PRIMARY) HYPERTENSION   Status: Acute   





(3) AAA (abdominal aortic aneurysm)


Code(s): I71.4 - ABDOMINAL AORTIC ANEURYSM, WITHOUT RUPTURE   Status: Acute   





(4) CLAY (obstructive sleep apnea)


Code(s): G47.33 - OBSTRUCTIVE SLEEP APNEA (ADULT) (PEDIATRIC)   Status: Acute   





(5) Tachycardia


Code(s): R00.0 - TACHYCARDIA, UNSPECIFIED   Status: Acute   





(6) Abnormal EKG


Code(s): R94.31 - ABNORMAL ELECTROCARDIOGRAM [ECG] [EKG]   Status: Acute   





- Plan


Plan: 





# Tachycardia


- 2 doses Adenosine OSH


- rate in 70s on admit


- Tele shows a few bigeminal PACs overnight, no other events


- TSH, Mag WNL


- carvedilol 6.125


- Discussed with Dr. Contreras & Jared, plan for home on ASA and Eliquis


- Dr. Thomas consulted, recs appreciated


   -recs stress test today, may start multaq





# Elevated troponin


- 0.15 -> 0.38 -> .603 -> .671 -> .785 -> .616


- CTA neg for PE at OSH





# HTN


- home meds


- imdur, lasix, lisinopril





# HLD


- high intesity statin





# recent AAA repair





# CLAY


- cpap at night





#PPx


- lovenox, scds





# Code


- full





<Jono aVldez - Last Filed: 03/14/18 12:40>





Attending Addendum





- Attending Addendum


Date/Time: 03/14/18 1257





I personally evaluated the patient and discussed the management with Dr. Valdez


I agree with the History, Examination, Assessment and Plan documented above 

with any addition or exceptions noted below.








<Aaron Denny - Last Filed: 03/14/18 12:58>

## 2018-03-14 NOTE — RAD
AP VIEW CHEST:

3/14/18

 

HISTORY: 

Chest pain.

 

AP view chest is obtained on 3/14/18.

 

Comparison made to previous exam from 3/11/18. 

 

AP view chest demonstrates sternotomy wires seen. EKG leads seen over the chest. The lungs are well a
erated. No evidence of active intrathoracic disease is seen. No evidence of effusions, pneumonia, or 
pneumothorax seen. 

 

Some cardiomegaly is noted.

 

IMPRESSION:  

Cardiomegaly, otherwise unremarkable AP view chest. 

 

POS: [facility]

## 2018-03-14 NOTE — PDOC.EVN
Event Note





- Event Note


Event Note: 





Called to bedside about 1330 for development of substernal chest pain. Dr. Contreras was called and he ordered nitro to be given. On examiner's arrival 

nitro had been administered and chest pain had resolved. EKGs showed no ST 

changes. Serial troponins were ordered. Patient will stay in hospital overnight.

## 2018-03-15 RX ADMIN — ASPIRIN SCH MG: 81 TABLET ORAL at 09:24

## 2018-03-15 NOTE — PDOC.FM
- Subjective


Subjective: 





This morning patient states he is feeling much better. He denies any chest pain 

or shortness of breath overnight since episode last PM. He states he is ready 

to go home but understands the importance of sticking around for further 

evaluation.





- Objective


Vital Signs & Weight: 


 Vital Signs (12 hours)











  Temp Pulse Resp BP BP Pulse Ox


 


 03/15/18 09:24     123/59 L  


 


 03/15/18 09:22     123/59 L  


 


 03/15/18 04:00  98.1 F  65  18   127/62  97








 Weight











Weight                         120.111 kg














I&O: 


 











 03/14/18 03/15/18 03/16/18





 06:59 06:59 06:59


 


Intake Total 600  


 


Output Total 1025 800 


 


Balance -425 -800 











Result Diagrams: 


 03/12/18 03:03





 03/12/18 03:03





<Jono Valdez - Last Filed: 03/15/18 11:04>





- Objective


Vital Signs & Weight: 


 Vital Signs (12 hours)











  Temp Pulse Resp BP BP Pulse Ox


 


 03/15/18 09:24     123/59 L  


 


 03/15/18 09:22     123/59 L  


 


 03/15/18 04:00  98.1 F  65  18   127/62  97








 Weight











Weight                         120.111 kg














I&O: 


 











 03/14/18 03/15/18 03/16/18





 06:59 06:59 06:59


 


Intake Total 600  


 


Output Total 1025 800 


 


Balance -425 -800 











Result Diagrams: 


 03/12/18 03:03





 03/12/18 03:03





<Aaron Denny - Last Filed: 03/15/18 12:16>





Phys Exam





- Physical Examination


Constitutional: NAD


HEENT: PERRLA, moist MMs


Neck: no nodes, full ROM


Respiratory: no wheezing, clear to auscultation bilateral


irregularly irregular, no murmur


Gastrointestinal: soft, non-tender, no distention, positive bowel sounds


Musculoskeletal: no edema, pulses present


Neurological: non-focal, moves all 4 limbs


Lymphatic: no nodes


Psychiatric: normal affect, A&O x 3


Skin: no rash, cap refill <2 seconds





<Jono Valdez - Last Filed: 03/15/18 11:04>





Dx/Plan


(1) Elevated troponin


Code(s): R74.8 - ABNORMAL LEVELS OF OTHER SERUM ENZYMES   Status: Acute   





(2) HTN (hypertension)


Code(s): I10 - ESSENTIAL (PRIMARY) HYPERTENSION   Status: Acute   





(3) AAA (abdominal aortic aneurysm)


Code(s): I71.4 - ABDOMINAL AORTIC ANEURYSM, WITHOUT RUPTURE   Status: Acute   





(4) CLAY (obstructive sleep apnea)


Code(s): G47.33 - OBSTRUCTIVE SLEEP APNEA (ADULT) (PEDIATRIC)   Status: Acute   





(5) Tachycardia


Code(s): R00.0 - TACHYCARDIA, UNSPECIFIED   Status: Acute   





(6) Abnormal EKG


Code(s): R94.31 - ABNORMAL ELECTROCARDIOGRAM [ECG] [EKG]   Status: Acute   





- Plan


Plan: 





# Tachycardia


- 2 doses Adenosine OSH


- rate in 70s on admit


- Tele shows a few bigeminal PACs overnight


- Stress test shows no irreversible changes


- TSH, Mag WNL


- carvedilol 6.125 


- Discussed with Dr. Ben Coleman, plan for home on ASA and Eliquis when 

ready for d/c


- Dr. Thomas consulted, recs appreciated


   -recs stress test today, may start multaq





# Elevated troponin


- 0.15 -> 0.38 -> .603 -> .671 -> .785 -> .616


- .328-> .269 on 3/14


- CTA neg for PE at OSH


- Spoke to Dr. Jauregui today, will eval for possible cath





# HTN


- home meds


- imdur, lasix, lisinopril





# HLD


- high intesity statin





# recent AAA repair





# CLAY


- cpap at night





#PPx


-scds, eliquis





# Code


- full 





<Jono Valdez - Last Filed: 03/15/18 11:04>





Attending Addendum





- Attending Addendum


Date/Time: 03/15/18 1215





I personally evaluated the patient and discussed the management with Dr. Valdez


I agree with the History, Examination, Assessment and Plan documented above 

with any addition or exceptions noted below.Will await Cardiology 

recommendations regarding further evaluation.








<Aaron Denny - Last Filed: 03/15/18 12:16>

## 2018-03-15 NOTE — PDOC.CTH
<Chanda Sims - Last Filed: 03/15/18 12:53>





Cardiology Progress Note





- Subjective





The pt seen and examined.  No overnight events after episode of CP last night.  

No cardiac complaints at this moment.  





- Objective


 Vital Signs











  Temp Pulse Resp BP BP Pulse Ox


 


 03/15/18 09:24     123/59 L  


 


 03/15/18 09:22     123/59 L  


 


 03/15/18 04:00  98.1 F  65  18   127/62  97








 











Weight                         264 lb 12.8 oz














 











 03/14/18 03/15/18 03/16/18





 06:59 06:59 06:59


 


Intake Total 600  


 


Output Total 1025 800 


 


Balance -425 -800 














- Physical Examination


General/Neuro: alert & oriented x3


Neck: no JVD present


Lungs: CTA


Heart: RRR


Abdomen: soft


Extremities: other: (No edema)





- Telemetry


Telemetry Rhythm: SR 60s with PVCs





- Labs


Result Diagrams: 


 03/12/18 03:03





 03/12/18 03:03


 Troponin/CKMB











CK-MB (CK-2)  1.0 ng/mL (0-6.6)   03/14/18  19:29    


 


Troponin I  0.269 ng/mL (< 0.028)  H  03/14/18  19:29    














- Assessment/Plan





1. CAD with hx of CABG x4 in 1986 and 2002 and stent in LAD in 1997 - cont. 

monitor on tele - the episode of CP last night although his Stress test showed 

normal; Cardiac cath tomorrow.


2. Afib/Aflutter - remains in SR with Eliquis 5mg BID and Multaq 400mg BID.  

Cont. to monitor on tele 


3. S/p AAA repair on 02/15/18 - cont. monitor


4. HTN - stable with current medication


5. CLAY - Per family, he has not had Cpap machine at home.  Strongly recommend 

to get and wear Cpap at HS.  They voiced understanding.


MAR reviewed





* Plan for Cardiac cath on 03/16/18 in AM.  The pt and family were explained 

the cardiac cath procedure and the risk of the procedure, such as infection, 

hemorrhage, perforation of cath, thrombosis, CVA, MI, allergic reaction to 

Iodine,  and death.  They voiced understanding and agreed to proceed the 

procedure.    











Review of Systems





- Review of Systems


Constitutional: reports: no symptoms reported


EENTM: reports: no symptoms reported


Respiratory: reports: no symptoms reported


Cardiac (ROS): reports: no symptoms reported


ABD/GI: reports: no symptoms reported


: reports: no symptoms reported


Musculoskeletal: reports: no symptoms reported


Skin: reports: no symptoms reported





<TRICIA Jauregui - Last Filed: 03/15/18 16:56>





Cardiology Progress Note





- Objective


 Vital Signs











  Temp Pulse Resp BP BP Pulse Ox


 


 03/15/18 16:21     95/54 L  


 


 03/15/18 11:49  97.7 F  65  18   118/66  97


 


 03/15/18 09:24     123/59 L  


 


 03/15/18 09:22     123/59 L  


 


 03/15/18 08:10  98 F  63  18   123/59 L  96








 











Weight                         264 lb 12.8 oz














 











 03/14/18 03/15/18 03/16/18





 06:59 06:59 06:59


 


Intake Total 600  


 


Output Total 1025 800 


 


Balance -425 -800 














- Labs


Result Diagrams: 


 03/12/18 03:03





 03/12/18 03:03


 Troponin/CKMB











CK-MB (CK-2)  1.0 ng/mL (0-6.6)   03/14/18  19:29    


 


Troponin I  0.269 ng/mL (< 0.028)  H  03/14/18  19:29    














- Assessment/Plan





Pt. seen and eval. by me. I agree with the A/P by the NP. Due to his extensive 

cardiac history and cheast pain event last night despite a negative stress test

, I advise a repeat cardiac cath. Plan for tomorrow AM. If no intervention 

required then prob. D/C tomorrow a couple hours after the cardiac cath.

## 2018-03-16 LAB
ALBUMIN SERPL BCG-MCNC: 3.9 G/DL (ref 3.4–4.8)
ALP SERPL-CCNC: 92 U/L (ref 40–150)
ALT SERPL W P-5'-P-CCNC: 19 U/L (ref 8–55)
ANION GAP SERPL CALC-SCNC: 13 MMOL/L (ref 10–20)
AST SERPL-CCNC: 19 U/L (ref 5–34)
BILIRUB SERPL-MCNC: 1 MG/DL (ref 0.2–1.2)
BUN SERPL-MCNC: 23 MG/DL (ref 8.4–25.7)
CALCIUM SERPL-MCNC: 8.9 MG/DL (ref 7.8–10.44)
CHLORIDE SERPL-SCNC: 96 MMOL/L (ref 98–107)
CO2 SERPL-SCNC: 24 MMOL/L (ref 23–31)
CREAT CL PREDICTED SERPL C-G-VRATE: 94 ML/MIN (ref 70–130)
GLOBULIN SER CALC-MCNC: 2.5 G/DL (ref 2.4–3.5)
GLUCOSE SERPL-MCNC: 115 MG/DL (ref 83–110)
HGB BLD-MCNC: 13.5 G/DL (ref 14–18)
MCH RBC QN AUTO: 31.8 PG (ref 27–31)
MCV RBC AUTO: 91.7 FL (ref 80–94)
MDIFF COMPLETE?: YES
PLATELET # BLD AUTO: 181 THOU/UL (ref 130–400)
POTASSIUM SERPL-SCNC: 3.6 MMOL/L (ref 3.5–5.1)
RBC # BLD AUTO: 4.25 MILL/UL (ref 4.7–6.1)
SODIUM SERPL-SCNC: 129 MMOL/L (ref 136–145)
WBC # BLD AUTO: 4.8 THOU/UL (ref 4.8–10.8)

## 2018-03-16 PROCEDURE — B2151ZZ FLUOROSCOPY OF LEFT HEART USING LOW OSMOLAR CONTRAST: ICD-10-PCS | Performed by: INTERNAL MEDICINE

## 2018-03-16 PROCEDURE — 4A023N7 MEASUREMENT OF CARDIAC SAMPLING AND PRESSURE, LEFT HEART, PERCUTANEOUS APPROACH: ICD-10-PCS | Performed by: INTERNAL MEDICINE

## 2018-03-16 PROCEDURE — B2111ZZ FLUOROSCOPY OF MULTIPLE CORONARY ARTERIES USING LOW OSMOLAR CONTRAST: ICD-10-PCS | Performed by: INTERNAL MEDICINE

## 2018-03-16 RX ADMIN — ASPIRIN SCH MG: 81 TABLET ORAL at 06:37

## 2018-03-16 NOTE — CON
DATE OF CONSULTATION:  03/16/2018

 

REFERRING PHYSICIAN:  Dr. Carla Jauregui.

 

REASON FOR CONSULTATION:  Atrial arrhythmia.

 

HISTORY OF PRESENT ILLNESS:  I was asked by Dr. Jauregui to provide electrophysiology consultation for Mr Myla Mantilla, very pleasant 77-year-old gentleman with a history of hypertension, coronary artery disea
se status post coronary bypass surgery and percutaneous aortic aneurysm repair.  He was admitted on 0
3/11/2018 with atrial fibrillation/flutter and has been started on dronedarone.  He has apparently re
ceived 4 doses to this point.  He underwent a cardiac catheterization this morning by Dr. Jauregui given 
intermittent chest pain.  He was not found to require revascularization.

 

He continues to have periods of elevated heart rate.  This morning, his rhythm appears to be consiste
nt with atrial tachycardia/flutter with a ventricular rate of approximately 116 beats per minute.  He
 is hemodynamically stable.  We were asked by Dr. Jauregui to assist in the management of his arrhythmia.
  It should be noted that during the course of his hospitalization, he has had periods of normal sinu
s rhythm.  Anticoagulation has been initiated with Eliquis 5 mg b.i.d.  He has had no CVA or TIA like
 symptoms.

 

PAST MEDICAL HISTORY:

1.  Coronary disease with history of coronary bypass surgery.

3.  History of percutaneous aortic aneurysm repair in 02/2018.

4.  Essential hypertension.

5.  Obstructive sleep apnea.

 

CURRENT MEDICATIONS:  Multaq 400 mg b.i.d., Eliquis 5 mg b.i.d., Lipitor 40 mg per day, furosemide 40
 mg per day, isosorbide mononitrate 30 mg per day.

 

ALLERGIES:  HYDROCHLOROTHIAZIDE.

 

FAMILY HISTORY:  No family history of arrhythmia or sudden death.

 

SOCIAL HISTORY:  No ethanol or tobacco.

 

REVIEW OF SYSTEMS:  A 12-point system review was discussed with Mr. Mantilla aside from that mentione
d history of present illness, otherwise negative.

 

PHYSICAL EXAMINATION:

VITAL SIGNS:  Blood pressure is 116/77, pulse is _____ and regular.

GENERAL:  He is alert and oriented with appropriate affect.

HEENT:  Moist mucous membranes.  Nonicteric sclerae.

NECK:  No bruits.  Normal thyroid.  No JVD.

CHEST:  Clear to auscultation in all fields.

HEART:  Mildly tachycardic, regular rhythm.  No murmurs, gallops, or rubs.  PMI is not displaced.

ABDOMEN:  Bowel sounds positive.  Normoactive, nontender.  No hepatosplenomegaly or masses.

EXTREMITIES:  No clubbing, cyanosis or edema.

NEUROLOGIC:  Grossly intact.

 

EKG this morning demonstrates probable atrial flutter with 2:1 AV conduction with a ventricular rate 
of 116 beats per minute.

 

IMPRESSION:

1.  Atrial tachycardia/flutter.

2.  History of coronary bypass surgery.

3.  History of percutaneous aortic aneurysm repair.

4.  Essential hypertension.

 

RECOMMENDATIONS:  I would continue Multaq until Mr. Mantilla is fully loaded.  If he continues to hav
e persistent atrial tachycardia/flutter, we would consider ablative therapy if his arrhythmia substra
te early next week.  We will continue Eliquis for CVA prophylaxis.  I had a discussion with Mr. Denzel rowan and his family regarding the rationale for ablation along with the risks involved.  The risks of 
the procedure include but are not limited to bleeding, infection, damage to blood vessels, pericardia
l tamponade, stroke, myocardial infarction, death.  He would like to proceed if necessary on Monday t
o Tuesday of next week, assuming that he continues to have intermittent/persistent arrhythmia.

## 2018-03-16 NOTE — PDOC.FM
Addendum entered and electronically signed by Jono Valdez MD  03/16/18 10:35: 





Had cath this morning. No stents.


EP consulted for eval, may need ablation for treatment of a. flutter


Dr. Coleman consulted, to eval cath images of ascending aorta, may be widened per 

Dr. Jauregui


Stopped amlodipine, lisinopril down to 10mg daily, Coreg 6.125 BID- monitor BP





Original Note:








- Subjective


Subjective: 





This morning patient states he slept well and had no chest pain or SOB 

overnight. He did have an episode of shortness of breath and light-headedness 

yesterday PM. He will go for cath this morning. Overnight, he was in and out of 

Atrial flutter based on tele.





- Objective


Vital Signs & Weight: 


 Vital Signs (12 hours)











  Temp Pulse Resp BP Pulse Ox


 


 03/16/18 04:00  98.2 F  109 H  22 H  116/77  94 L








 Weight











Weight                         120.247 kg














I&O: 


 











 03/15/18 03/16/18 03/17/18





 06:59 06:59 06:59


 


Intake Total  480 


 


Output Total 800 1300 


 


Balance -800 -820 











Result Diagrams: 


 03/16/18 04:39





 03/16/18 04:39





<Jono Valdez - Last Filed: 03/16/18 08:34>





- Objective


Vital Signs & Weight: 


 Vital Signs (12 hours)











  Temp Pulse Resp BP BP Pulse Ox


 


 03/16/18 11:12  97.9 F  112 H  17   123/74  95


 


 03/16/18 08:30  97.8 F  111 H  18   107/67  92 L


 


 03/16/18 04:00  98.2 F  109 H  22 H  116/77   94 L








 Weight











Weight                         120.247 kg














I&O: 


 











 03/15/18 03/16/18 03/17/18





 06:59 06:59 06:59


 


Intake Total  480 


 


Output Total 800 1300 


 


Balance -800 -820 











Result Diagrams: 


 03/16/18 04:39





 03/16/18 04:39





<Aaron Denny - Last Filed: 03/16/18 13:33>





Phys Exam





- Physical Examination


Constitutional: NAD


HEENT: PERRLA, moist MMs


Neck: no nodes, full ROM


Respiratory: no wheezing, clear to auscultation bilateral


Cardiovascular: RRR, no significant murmur


Gastrointestinal: soft, non-tender, no distention, positive bowel sounds


Musculoskeletal: no edema, pulses present


Neurological: non-focal, moves all 4 limbs


Lymphatic: no nodes


Psychiatric: normal affect, A&O x 3


Skin: no rash, cap refill <2 seconds





<Jono Valdez - Last Filed: 03/16/18 08:34>





Dx/Plan


(1) Elevated troponin


Code(s): R74.8 - ABNORMAL LEVELS OF OTHER SERUM ENZYMES   Status: Acute   





(2) HTN (hypertension)


Code(s): I10 - ESSENTIAL (PRIMARY) HYPERTENSION   Status: Acute   





(3) AAA (abdominal aortic aneurysm)


Code(s): I71.4 - ABDOMINAL AORTIC ANEURYSM, WITHOUT RUPTURE   Status: Acute   





(4) CLAY (obstructive sleep apnea)


Code(s): G47.33 - OBSTRUCTIVE SLEEP APNEA (ADULT) (PEDIATRIC)   Status: Acute   





(5) Tachycardia


Code(s): R00.0 - TACHYCARDIA, UNSPECIFIED   Status: Acute   





(6) Abnormal EKG


Code(s): R94.31 - ABNORMAL ELECTROCARDIOGRAM [ECG] [EKG]   Status: Acute   





- Plan


Plan: 





# Tachycardia


- 2 doses Adenosine OSH


- rate in 70s on admit


- Tele shows a. flutter overnight


- BP medications held yesterday PM 2/2 hypotension


- Stress test shows no irreversible changes


- TSH, Mag WNL


- Discussed with Dr. Contreras & Jared, plan for home on ASA and Eliquis when 

ready for d/c


- Dr. Jauregui with cath patient this AM


   -appreciate recs on management of tachycardia & episodes of hypotension


   





# Elevated troponin


- 0.15 -> 0.38 -> .603 -> .671 -> .785 -> .616


- .328-> .269 on 3/14


- CTA neg for PE at OSH


- last 2 days, had an episode of chest pain each afternoon





# HTN


- home meds


- imdur, lasix, lisinopril





# HLD


- high intesity statin





# recent AAA repair 2/15/18





# CLAY


- not on cpap





#PPx


-scds, eliquis





# Code


- full 





<Jono Valdez - Last Filed: 03/16/18 08:34>





Attending Addendum





- Attending Addendum


Date/Time: 03/16/18 1330





I personally evaluated the patient and discussed the management with Dr. Valdez


I agree with the History, Examination, Assessment and Plan documented above 

with any addition or exceptions noted below. Patient s/p Heart cath will need 

further EP evaluation for possible ablative procedure. Continue observation on 

multaq.








<Aaron Denny - Last Filed: 03/16/18 13:33>

## 2018-03-17 RX ADMIN — ASPIRIN SCH MG: 81 TABLET ORAL at 09:13

## 2018-03-17 NOTE — PDOC.FM
- Subjective


Subjective: 





Patient is resting comfortably this morning.  Reports no CP, no feelings of 

palpitations, no SOB.  Overnight, patient continued to have irregular Aflutter 

rhythm with tachycardia into the 110's.  Patient is on board for ablation on 

Monday or Tuesday. 





- Objective


MAR Reviewed: Yes


Vital Signs & Weight: 


 Vital Signs (12 hours)











  Temp Pulse Resp BP Pulse Ox


 


 03/17/18 04:00  98.1 F  114 H  21 H  124/63  96


 


 03/17/18 00:00  97.9 F  113 H  18  131/99 H  96


 


 03/16/18 20:00  97.9 F  113 H  18  114/77  97








 Weight











Weight                         120.701 kg














I&O: 


 











 03/15/18 03/16/18 03/17/18





 06:59 06:59 06:59


 


Intake Total  480 1060


 


Output Total 800 1300 1380


 


Balance -800 -820 -320











Result Diagrams: 


 03/16/18 04:39





 03/16/18 04:39





<Tina Galdamez - Last Filed: 03/17/18 07:55>





- Objective


Vital Signs & Weight: 


 Vital Signs (12 hours)











  Temp Pulse Resp BP BP BP Pulse Ox


 


 03/17/18 11:13  97.8 F  122 H  16   92/60   96


 


 03/17/18 08:00  97.7 F  73  17    


 


 03/17/18 07:44   73  17    100/70  94 L


 


 03/17/18 04:00  98.1 F  114 H  21 H  124/63    96








 Weight











Weight                         120.701 kg














I&O: 


 











 03/16/18 03/17/18 03/18/18





 06:59 06:59 06:59


 


Intake Total 480 1060 


 


Output Total 1300 1380 


 


Balance -820 -320 











Result Diagrams: 


 03/16/18 04:39





 03/16/18 04:39





<Aaron Denny - Last Filed: 03/17/18 12:04>





Phys Exam





- Physical Examination


Constitutional: NAD


HEENT: PERRLA, moist MMs


Respiratory: no wheezing, no rales, clear to auscultation bilateral


irregularly irregular


Gastrointestinal: soft, non-tender


Musculoskeletal: no edema


Neurological: moves all 4 limbs


Psychiatric: A&O x 3





<Tina Galdamez - Last Filed: 03/17/18 07:55>





Dx/Plan


(1) Atrial flutter


Code(s): I48.92 - UNSPECIFIED ATRIAL FLUTTER   Status: Acute   





(2) Tachycardia


Code(s): R00.0 - TACHYCARDIA, UNSPECIFIED   Status: Acute   





(3) AAA (abdominal aortic aneurysm)


Code(s): I71.4 - ABDOMINAL AORTIC ANEURYSM, WITHOUT RUPTURE   Status: Acute   





(4) Elevated troponin


Code(s): R74.8 - ABNORMAL LEVELS OF OTHER SERUM ENZYMES   Status: Acute   





(5) HTN (hypertension)


Code(s): I10 - ESSENTIAL (PRIMARY) HYPERTENSION   Status: Acute   





(6) CLAY (obstructive sleep apnea)


Code(s): G47.33 - OBSTRUCTIVE SLEEP APNEA (ADULT) (PEDIATRIC)   Status: Acute   





- Plan


Plan: 





# Tachycardia, Aflutter vs Afib


- 2 doses Adenosine OSH


- rate in 70s on admit


- continued abnormal rhythm overnight


- Stress test shows no irreversible changes


- cath negative 


- TSH, Mag WNL


- Discussed with Dr. Contreras & Jared, plan for home on ASA and Eliquis when 

ready for d/c


- EP to evaluate and plan for ablation on Monday or Tuesday if continuing to 

have episodes of aflutter





# Elevated troponin


- 0.15 -> 0.38 -> .603 -> .671 -> .785 -> .616


- .328-> .269 on 3/14


- CTA neg for PE at OSH


- last 2 days, had an episode of chest pain each afternoon, resolved with nitro





# HTN


- home meds


- episodes of hypotension during hospitalization, continue to monitor BP, 

currently normotensive


- imdur, lasix, lisinopril





# HLD


- high intesity statin





# recent AAA repair 2/15/18


- possible aortic widening on imaging per Dr. Jauregui


- Dr. Coleman to evaluate





# CLAY


- not on cpap





Dispo: Patient to get ablation on Mon or Tuesday.  Likely d/c once normal sinus 

rhythm or rate controlled.  





<Tina Galdamez - Last Filed: 03/17/18 07:55>





Attending Addendum





- Attending Addendum


Date/Time: 03/17/18 1202





I personally evaluated the patient and discussed the management with Dr. Galdamez


I agree with the History, Examination, Assessment and Plan documented above 

with any addition or exceptions noted below.


Patient with irregular rhythm atrial flutter continue multaq and observation 

denies any current CP for EP study ablation Monday.








<Aaron Denny - Last Filed: 03/17/18 12:04>

## 2018-03-17 NOTE — PDOC.FM
- Objective


Vital Signs & Weight: 


 Vital Signs (12 hours)











  Temp Pulse Resp BP Pulse Ox


 


 03/17/18 04:00  98.1 F  114 H  21 H  124/63  96


 


 03/17/18 00:00  97.9 F  113 H  18  131/99 H  96


 


 03/16/18 20:00  97.9 F  113 H  18  114/77  97








 Weight











Weight                         120.701 kg














I&O: 


 











 03/15/18 03/16/18 03/17/18





 06:59 06:59 06:59


 


Intake Total  480 1060


 


Output Total 800 1300 1380


 


Balance -800 -820 -320











Result Diagrams: 


 03/16/18 04:39





 03/16/18 04:39

## 2018-03-18 RX ADMIN — ASPIRIN SCH MG: 81 TABLET ORAL at 09:44

## 2018-03-18 NOTE — PDOC.FM
- Subjective


Subjective: 





Patient is resting this morning. Only complaint is some nasal congestion he 

relates to seasonal allergies.  Denies CP, SOB, and palpitations at this time.  

No acute events overnight. 





- Objective


MAR Reviewed: Yes


Vital Signs & Weight: 


 Vital Signs (12 hours)











  Temp Pulse Resp BP Pulse Ox


 


 03/18/18 05:07  97.8 F  82  15  119/68  97


 


 03/17/18 20:00  98.0 F  113 H  18  122/79  95








 Weight











Weight                         120.61 kg














I&O: 


 











 03/16/18 03/17/18 03/18/18





 06:59 06:59 06:59


 


Intake Total 480 1060 1930


 


Output Total 1300 1380 1375


 


Balance -820 -320 555











Result Diagrams: 


 03/16/18 04:39





 03/16/18 04:39





<Tina Galdamez - Last Filed: 03/18/18 11:36>





- Objective


Vital Signs & Weight: 


 Vital Signs (12 hours)











  Temp Pulse Resp BP Pulse Ox


 


 03/18/18 15:31  97.8 F  118 H  22 H  120/87  96


 


 03/18/18 11:35  98.1 F  90  16  125/67  96


 


 03/18/18 08:00  97.9 F  79  18  


 


 03/18/18 07:34  97.9 F  79  18  135/84  95








 Weight











Weight                         120.61 kg














I&O: 


 











 03/17/18 03/18/18 03/19/18





 06:59 06:59 06:59


 


Intake Total 1060 1930 


 


Output Total 1380 1375 


 


Balance -320 555 











Result Diagrams: 


 03/16/18 04:39





 03/16/18 04:39





<Aaron Denny - Last Filed: 03/18/18 17:14>





Phys Exam





- Physical Examination


Constitutional: NAD


HEENT: PERRLA, moist MMs, oral pharynx no lesions


nasal congestion


Neck: no nodes


Respiratory: no wheezing, no rales, clear to auscultation bilateral


Cardiovascular: RRR, no significant murmur


Gastrointestinal: soft, non-tender, no distention


Neurological: moves all 4 limbs


Psychiatric: A&O x 3





<Tina Galdamez - Last Filed: 03/18/18 11:36>





Dx/Plan


(1) Atrial flutter


Code(s): I48.92 - UNSPECIFIED ATRIAL FLUTTER   Status: Acute   





(2) Tachycardia


Code(s): R00.0 - TACHYCARDIA, UNSPECIFIED   Status: Acute   





(3) AAA (abdominal aortic aneurysm)


Code(s): I71.4 - ABDOMINAL AORTIC ANEURYSM, WITHOUT RUPTURE   Status: Acute   





(4) Elevated troponin


Code(s): R74.8 - ABNORMAL LEVELS OF OTHER SERUM ENZYMES   Status: Acute   





(5) HTN (hypertension)


Code(s): I10 - ESSENTIAL (PRIMARY) HYPERTENSION   Status: Acute   





(6) CLAY (obstructive sleep apnea)


Code(s): G47.33 - OBSTRUCTIVE SLEEP APNEA (ADULT) (PEDIATRIC)   Status: Acute   





- Plan


Plan: 





# Tachycardia, Aflutter vs Afib


- 2 doses Adenosine OSH


- rate in 70s on admit


- continued abnormal rhythm overnight


- Stress test shows no irreversible changes


- cath negative 


- TSH, Mag WNL


- Discussed with Dr. Contreras & Jared, plan for home on ASA and Eliquis when 

ready for d/c


- EP to evaluate and plan for ablation on Monday or Tuesday if continuing to 

have episodes of aflutter





# Elevated troponin


- 0.15 -> 0.38 -> .603 -> .671 -> .785 -> .616


- .328-> .269 on 3/14


- CTA neg for PE at OSH


- last 2 days, had an episode of chest pain each afternoon, resolved with nitro





# HTN


- home meds


- episodes of hypotension during hospitalization, continue to monitor BP, 

currently normotensive


- imdur, lasix, lisinopril





# HLD


- high intesity statin





# recent AAA repair 2/15/18


- possible aortic widening on imaging per Dr. Jauregui


- Dr. Coleman to evaluate





# CLAY


- not on cpap





# Seasonal Allergies


- Mucinex


- Flonase


- daily Claritin





Dispo: Patient to get ablation on Mon or Tuesday.  Likely d/c once normal sinus 

rhythm or rate controlled.  








<Tina Galdamez - Last Filed: 03/18/18 11:36>





Attending Addendum





- Attending Addendum


Date/Time: 03/18/18 1714





I personally evaluated the patient and discussed the management with Dr. Galdamez


I agree with the History, Examination, Assessment and Plan documented above 

with any addition or exceptions noted below.








<Aaron Denny - Last Filed: 03/18/18 17:14>

## 2018-03-19 LAB
ANION GAP SERPL CALC-SCNC: 14 MMOL/L (ref 10–20)
APTT PPP: 35.6 SEC (ref 22.9–36.1)
BASOPHILS # BLD AUTO: 0 THOU/UL (ref 0–0.2)
BASOPHILS NFR BLD AUTO: 0.1 % (ref 0–1)
BUN SERPL-MCNC: 14 MG/DL (ref 8.4–25.7)
CALCIUM SERPL-MCNC: 9 MG/DL (ref 7.8–10.44)
CHLORIDE SERPL-SCNC: 96 MMOL/L (ref 98–107)
CO2 SERPL-SCNC: 23 MMOL/L (ref 23–31)
CREAT CL PREDICTED SERPL C-G-VRATE: 119 ML/MIN (ref 70–130)
EOSINOPHIL # BLD AUTO: 0.1 THOU/UL (ref 0–0.7)
EOSINOPHIL NFR BLD AUTO: 2.8 % (ref 0–10)
GLUCOSE SERPL-MCNC: 110 MG/DL (ref 83–110)
HGB BLD-MCNC: 13.5 G/DL (ref 14–18)
INR PPP: 1.2
LYMPHOCYTES # BLD: 1.2 THOU/UL (ref 1.2–3.4)
LYMPHOCYTES NFR BLD AUTO: 23.1 % (ref 21–51)
MCH RBC QN AUTO: 31.5 PG (ref 27–31)
MCV RBC AUTO: 89.6 FL (ref 80–94)
MONOCYTES # BLD AUTO: 0.6 THOU/UL (ref 0.11–0.59)
MONOCYTES NFR BLD AUTO: 11 % (ref 0–10)
NEUTROPHILS # BLD AUTO: 3.2 THOU/UL (ref 1.4–6.5)
NEUTROPHILS NFR BLD AUTO: 63 % (ref 42–75)
PLATELET # BLD AUTO: 180 THOU/UL (ref 130–400)
POTASSIUM SERPL-SCNC: 3.5 MMOL/L (ref 3.5–5.1)
PROTHROMBIN TIME: 15.7 SEC (ref 12–14.7)
RBC # BLD AUTO: 4.29 MILL/UL (ref 4.7–6.1)
SODIUM SERPL-SCNC: 129 MMOL/L (ref 136–145)
WBC # BLD AUTO: 5.1 THOU/UL (ref 4.8–10.8)

## 2018-03-19 PROCEDURE — 4A0234Z MEASUREMENT OF CARDIAC ELECTRICAL ACTIVITY, PERCUTANEOUS APPROACH: ICD-10-PCS | Performed by: INTERNAL MEDICINE

## 2018-03-19 PROCEDURE — 02K83ZZ MAP CONDUCTION MECHANISM, PERCUTANEOUS APPROACH: ICD-10-PCS | Performed by: INTERNAL MEDICINE

## 2018-03-19 PROCEDURE — 4A023FZ MEASUREMENT OF CARDIAC RHYTHM, PERCUTANEOUS APPROACH: ICD-10-PCS | Performed by: INTERNAL MEDICINE

## 2018-03-19 PROCEDURE — 02583ZZ DESTRUCTION OF CONDUCTION MECHANISM, PERCUTANEOUS APPROACH: ICD-10-PCS | Performed by: INTERNAL MEDICINE

## 2018-03-19 RX ADMIN — ASPIRIN SCH: 81 TABLET ORAL at 17:46

## 2018-03-19 RX ADMIN — Medication SCH ML: at 22:09

## 2018-03-19 NOTE — ADD-PRG
This is an addendum to the note of Dr. Jono Valdez.  Mr. Mantilla was admitted with atrial fib/flutter 
with RVR.  He is currently on several medications and still having atrial fibrillation flutter with R
VR.  He has been seen in consultation by Electrophysiology and will be taken for an ablation later to
day.  Clinically, he is stable.

## 2018-03-19 NOTE — PDOC.FM
- Subjective


Subjective: 





This morning the patient denies any chest pain, palpitations, or shortness of 

breath overnight. He states he is feeling fine ambulating to the restroom. He 

and his wife ask appropriate questions about ablation. 





- Objective


Vital Signs & Weight: 


 Vital Signs (12 hours)











  Temp Pulse Resp BP Pulse Ox


 


 03/18/18 23:00  98.1 F  118 H  20  138/87  96


 


 03/18/18 20:30  98.3 F  118 H  20  133/67  95








 Weight











Weight                         120.61 kg














I&O: 


 











 03/17/18 03/18/18 03/19/18





 06:59 06:59 06:59


 


Intake Total 1060 1930 1210


 


Output Total 1380 1375 850


 


Balance -320 555 360











Result Diagrams: 


 03/19/18 05:04





 03/19/18 05:04





Phys Exam





- Physical Examination


Constitutional: NAD


HEENT: PERRLA, moist MMs


Neck: no nodes, full ROM


Respiratory: no wheezing, clear to auscultation bilateral


Cardiovascular: no significant murmur


irregularly irregular rhythm


Gastrointestinal: soft, non-tender, no distention, positive bowel sounds


Musculoskeletal: no edema, pulses present


Neurological: non-focal, moves all 4 limbs


Lymphatic: no nodes


Psychiatric: normal affect, A&O x 3


Skin: no rash, cap refill <2 seconds





Dx/Plan


(1) Elevated troponin


Code(s): R74.8 - ABNORMAL LEVELS OF OTHER SERUM ENZYMES   Status: Acute   





(2) HTN (hypertension)


Code(s): I10 - ESSENTIAL (PRIMARY) HYPERTENSION   Status: Acute   





(3) AAA (abdominal aortic aneurysm)


Code(s): I71.4 - ABDOMINAL AORTIC ANEURYSM, WITHOUT RUPTURE   Status: Acute   





(4) CLAY (obstructive sleep apnea)


Code(s): G47.33 - OBSTRUCTIVE SLEEP APNEA (ADULT) (PEDIATRIC)   Status: Acute   





(5) Tachycardia


Code(s): R00.0 - TACHYCARDIA, UNSPECIFIED   Status: Acute   





(6) Abnormal EKG


Code(s): R94.31 - ABNORMAL ELECTROCARDIOGRAM [ECG] [EKG]   Status: Acute   





- Plan


Plan: 





# Tachycardia, Aflutter vs Afib


- 2 doses Adenosine OSH


- rate in 70s on admit


- Stress test shows no irreversible changes


- cath negative 


- TSH, Mag WNL


- Discussed anticoagulation with Dr. Ben Coleman, plan for home on ASA and 

Eliquis when ready for d/c


- Cath last week by Dr. Jauregui, recommends EP eval





- A flutter over the weekend, despite dronedarone


- EP eval for ablation today





# Elevated troponin


- 0.15 -> 0.38 -> .603 -> .671 -> .785 -> .616


- .328-> .269 on 3/14


- CTA neg for PE at OSH


- last 2 days, had an episode of chest pain each afternoon, resolved with nitro





# HTN


- home meds


- episodes of hypotension during hospitalization, continue to monitor BP, 

currently normotensive


- imdur, lasix, lisinopril 10mg after EP evaluation





# HLD


- high intesity statin





# recent AAA repair 2/15/18


- possible aortic widening on imaging per Dr. Jauregui recs CV surgery eval





# CLAY


- not on cpap





# Seasonal Allergies


- Mucinex


- Flonase


- daily Claritin





Dispo: likely ablation today, possible d/c tomorrow pending course

## 2018-03-19 NOTE — EKG
Test Reason : PREOP

Blood Pressure : ***/*** mmHG

Vent. Rate : 120 BPM     Atrial Rate : 120 BPM

   P-R Int : 000 ms          QRS Dur : 132 ms

    QT Int : 300 ms       P-R-T Axes : 000 -11 192 degrees

   QTc Int : 424 ms

 

Sinus tachycardia with 1st degree A-V block

Non-specific intra-ventricular conduction block

T wave abnormality, consider inferolateral ischemia

Abnormal ECG

When compared with ECG of 14-MAR-2018 13:22, (Unconfirmed)

AL interval has decreased

Vent. rate has increased BY  57 BPM

Confirmed by KRIS WESTON,  SMyla (4) on 3/19/2018 8:04:29 PM

 

Referred By:  [facility]           Confirmed By:DR. GIL PONCE MD

## 2018-03-19 NOTE — PDOC.CTH
<Polly Jose - Last Filed: 03/19/18 14:56>





Cardiology Progress Note





- Subjective





EP progress note:





Patient seen and evaluated. Has done well over the weekend and is prepared for 

ablation today. He does not have any new cardiac complaints. Wife is bedside. 





- Objective


 Vital Signs











  Temp Pulse Resp BP Pulse Ox


 


 03/19/18 12:00  98.1 F  119 H  19  126/82  95


 


 03/19/18 08:00  98.9 F  122 H  18  128/84  96


 


 03/19/18 05:00  98.3 F  119 H  20  122/80  95








 











Weight                         262 lb 12.8 oz














 











 03/18/18 03/19/18 03/20/18





 06:59 06:59 06:59


 


Intake Total 1930 1690 


 


Output Total 1375 1900 


 


Balance 555 -210 














- Physical Examination


General/Neuro: alert & oriented x3, NAD


Neck: carotid US brisk, no JVD present


Lungs: unlabored respirations


Heart: PMI normal


Abdomen: no HSM, NT/ND





- Labs


Result Diagrams: 


 03/19/18 05:04





 03/19/18 05:04


 Troponin/CKMB











CK-MB (CK-2)  1.0 ng/mL (0-6.6)   03/14/18  19:29    


 


Troponin I  0.269 ng/mL (< 0.028)  H  03/14/18  19:29    














- Assessment/Plan





1. Typical atrial flutter vs atrial tachycardia- CTI ablation scheduled for 

today. All questions answered. No atrial fibrillation seen yet. On Multaq for 

arrhythmia suppression.


2. Elevated CHADS-VASC of 4: age, HTN, vascular disease, continue Eliquis 5mg 

BID for CVA prophylaxis. Continue post ablation without interruption


3. CAD, prior CABG





<BrissaRichie - Last Filed: 03/19/18 16:21>





Cardiology Progress Note





- Objective


 Vital Signs











  Temp Pulse Resp BP Pulse Ox


 


 03/19/18 12:00  98.1 F  119 H  19  126/82  95


 


 03/19/18 08:00  98.9 F  122 H  18  128/84  96


 


 03/19/18 05:00  98.3 F  119 H  20  122/80  95








 











Weight                         262 lb 12.8 oz














 











 03/18/18 03/19/18 03/20/18





 06:59 06:59 06:59


 


Intake Total 1930 1690 


 


Output Total 8252 1900 


 


Balance 555 -210 














- Labs


Result Diagrams: 


 03/19/18 05:04





 03/19/18 05:04


 Troponin/CKMB











CK-MB (CK-2)  1.0 ng/mL (0-6.6)   03/14/18  19:29    


 


Troponin I  0.269 ng/mL (< 0.028)  H  03/14/18  19:29    














Attending Addendum





- Attending Addendum


Date/Time: 03/19/18 1620





I personally evaluated the patient and discussed the management with Ms Jose.


I agree with the History, Examination, Assessment and Plan documented above 

with any addition or exceptions noted below.

## 2018-03-20 VITALS — SYSTOLIC BLOOD PRESSURE: 146 MMHG | DIASTOLIC BLOOD PRESSURE: 75 MMHG | TEMPERATURE: 98.4 F

## 2018-03-20 RX ADMIN — ASPIRIN SCH MG: 81 TABLET ORAL at 09:43

## 2018-03-20 RX ADMIN — Medication SCH ML: at 09:44

## 2018-03-20 NOTE — ADD-PRG
ADDENDUM

 

This is an addendum to the note of Dr. Jono Valdez.  Mr. Mantilla underwent successful ablation yesterd
ay and is currently in normal sinus rhythm.  He will be discharged later today on a beta blocker and 
Eliquis.  Clinically, he is stable, cheerful, ready for discharge.

## 2018-03-20 NOTE — PDOC.CTH
Cardiology Progress Note





- Subjective





Pt. seen and eval. no overnight events. Feels good . Maintaining sinus rhythm 

after flutter ablation.





- Objective


 Vital Signs











  Temp Pulse Pulse Pulse Resp BP BP


 


 03/20/18 16:00  98.4 F     18  


 


 03/20/18 12:00  97.2 F L  82    17  


 


 03/20/18 10:12    78  83    116/75


 


 03/20/18 09:43       130/76 


 


 03/20/18 08:00  98.4 F  74    18  














  BP BP Pulse Ox Pulse Ox


 


 03/20/18 16:00   146/75 H  96 


 


 03/20/18 12:00   142/76 H  95 


 


 03/20/18 10:12  155/89 H    98


 


 03/20/18 09:43    


 


 03/20/18 08:00   136/77  96 








 











Weight                         259 lb 14.4 oz














 











 03/19/18 03/20/18 03/21/18





 06:59 06:59 06:59


 


Intake Total 1690 1200 240


 


Output Total 1900 1650 


 


Balance -210 -450 240














- Physical Examination


General/Neuro: alert & oriented x3


Neck: carotid US brisk


Lungs: CTA


Heart: RRR


Abdomen: NT/ND





- Labs


Result Diagrams: 


 03/19/18 05:04





 03/19/18 05:04


 Troponin/CKMB











CK-MB (CK-2)  1.0 ng/mL (0-6.6)   03/14/18  19:29    


 


Troponin I  0.269 ng/mL (< 0.028)  H  03/14/18  19:29    














- Assessment/Plan





1. A-flutter. s/p ablation. maintaining NSR. Continue Coreg. Stop Multaq. ready 

for d/c. F/U with Dr. Jauregui in 3-4 weeks.


2. CAD: s/p CABG remote. stable.

## 2018-03-20 NOTE — EKG
Test Reason : 

Blood Pressure : ***/*** mmHG

Vent. Rate : 063 BPM     Atrial Rate : 063 BPM

   P-R Int : 168 ms          QRS Dur : 122 ms

    QT Int : 440 ms       P-R-T Axes : 056 001 213 degrees

   QTc Int : 450 ms

 

Normal sinus rhythm

Non-specific intra-ventricular conduction delay



Abnormal ECG

When compared with ECG of 12-MAR-2018 07:14, (Unconfirmed)

No significant change was found

Confirmed by JOSEPH CLEARY (2) on 3/20/2018 8:08:03 PM

 

Referred By:  [facility]           Confirmed By:JOSEPH CLEARY

## 2018-03-20 NOTE — EKG
Test Reason : 

Blood Pressure : ***/*** mmHG

Vent. Rate : 085 BPM     Atrial Rate : 085 BPM

   P-R Int : 192 ms          QRS Dur : 134 ms

    QT Int : 398 ms       P-R-T Axes : 057 000 -37 degrees

   QTc Int : 473 ms

 

Normal sinus rhythm

Non-specific intra-ventricular conduction block

Nonspecific T wave abnormality

Abnormal ECG

When compared with ECG of 19-MAR-2018 16:31, (Unconfirmed)

No significant change was found

Confirmed by KRIS WESTON, . SMyla (4) on 3/20/2018 8:38:07 PM

 

Referred By:  [facility]           Confirmed By:DR. GIL PONCE MD

## 2018-03-20 NOTE — EKG
Test Reason : 

Blood Pressure : ***/*** mmHG

Vent. Rate : 066 BPM     Atrial Rate : 066 BPM

   P-R Int : 148 ms          QRS Dur : 132 ms

    QT Int : 430 ms       P-R-T Axes : 059 -13 177 degrees

   QTc Int : 450 ms

 

Normal sinus rhythm

Non-specific intra-ventricular conduction block

T wave abnormality, consider lateral ischemia

Abnormal ECG

When compared with ECG of 11-MAR-2018 13:32, (Unconfirmed)

Sinus rhythm has replaced Atrial fibrillation

Vent. rate has decreased BY  33 BPM

T wave inversion now evident in Anterior leads

Confirmed by JOSEPH CLEARY (2) on 3/20/2018 7:41:18 PM

 

Referred By:  LEANDRO           Confirmed By:JOSEPH CLEARY

## 2018-03-20 NOTE — CCLSPC
ELECTROPHYSIOLOGY STUDY:

 

Date:  03/19/18 

 

REASON FOR PROCEDURE:

Mr. Mantilla is a 77-year-old male with history of recent aortic aneurysm 
repair who presented with new onset atrial flutter transiently converted back 
to sinus rhythm. He has been anticoagulated. He has had recurrent atrial 
flutter since. His last episode was yesterday. He is here for evaluation of the 
atrial arrhythmia and possible ablation.

 

PROCEDURE:  

The patient received deep sedation by anesthesia specialist. After adequate 
level of sedation achieved, the left and right femoral veins were anesthetized 
using subcutaneous lidocaine. The right femoral vein was accessed x2 with 
ultrasound guidance. Two short 8 Emir[place] sheaths were introduced. A decapolar CS 
catheter was advanced to the RV, His bundle, right atrium, and eventually to 
the CS position. Baseline study was performed, demonstrating shortest post-
pacing interval after entrainment at the proximal CS, longer at the lateral CS, 
suggestive of right atrial flutter circuit origin. Following that, a standard 
Smart Touch bidirectional catheter was advanced to the right atrium and 3D map 
of the right atrium was obtained. Radiofrequency ablation of the cavotricuspid 
isthmus was performed, achieving termination of the atrial flutter. Following 
that,  proximal CS pacing was initiated and further burns were delivered to the 
cavotricuspid isthmus, achieving increase of the transisthmus time from 30 
milliseconds to 180 milliseconds. Block through the cavotricuspid isthmus 
demonstrated progressively lengthening transisthmus time measured from lateral 
right atrium to adjacent to the isthmus ablation line.

 

following that, basic EP studies were performed, showing a markedly prolonged 
sinus node recovery time at 2474 milliseconds, which was corrected to 1774 
milliseconds. AV Wenckebach cycle length was 320 milliseconds. VA conduction 
was not observed. AV node /320 milliseconds. No AV node physiology was 
observed. Burst atrial pacing did not initiate further atrial arrhythmias. 
Following that, isuprel was administered and the patency of the cavotricuspid 
isthmus block was rechecked and no induction of the arrhythmia was seen.

 

CONCLUSION:

1.  Typical cavotricuspid isthmus dependent atrial flutter detected at baseline.

2.  Status post cavotricuspid isthmus ablation, eliminating the atrial flutter. 

3.  Post ablation markedly prolonged sinus node recovery time suggestive of 
sinus node being suppressed.

4.  No other atrial arrhythmias were induced.

 

PLAN:  

Resume anticoagulation, if feasible, for a month, and monitor for further 
arrhythmias.  Stop dronaderone.    Consider pacing if bradycardic symptoms 
occur.  

 

 

MTDD

## 2018-03-20 NOTE — DIS-2
DATE OF ADMISSION:  03/11//2018

 

DATE OF DISCHARGE:  03/20/2018

 

RESIDENT:  Dr. Jono Valdez.

 

ADMITTING ATTENDING:  Dr. Aaron Denny.

 

DISCHARGE ATTENDING:  Dr. George Castro.

 

CONSULTATIONS:  Cardiology, Dr. Thomas, Dr. Contreras, Dr. Jauregui; electrophysiology, Dr. Brumfield.

 

PROCEDURES:  Stress test unequivocal, catheterization, no stents, EP ablation.

 

PRIMARY DIAGNOSIS:  Atrial flutter.

 

SECONDARY DIAGNOSES:  Elevated troponin, hypertension, hyperlipidemia, recent AAA repair, obstructive
 sleep apnea, seasonal allergies.

 

DISCHARGE MEDICATIONS:  Eliquis 5 mg p.o. b.i.d., Coreg 6.25 mg p.o. b.i.d., Lasix 40 mg p.o. daily, 
lisinopril 10 mg p.o. daily, multivitamin daily, folic acid daily, nitroglycerin as needed, Lipitor a
s well as Imdur.

 

DISCONTINUED MEDICATIONS:  Plavix, Lovenox, Multaq.

 

HISTORY OF PRESENT ILLNESS AND HOSPITAL COURSE:  A 77-year-old male who presented to outside ER after
 noticing that his heart rate was high when he was checking his blood pressure.  He had no shortness 
of breath, no chest pain, no weakness.  He had a pertinent history of two CABG procedures, latest thad
ng around 2002 as well as AAA repair in 02/2018.  On admission, the EKG was unclear whether this was 
atrial flutter or atrial fibrillation with RVR.  He was evaluated and started on rate control medicat
ions.  Then, we had a stress test which showed unlikely to be having ischemic event.  The patient was
 rate controlled and then recommended for discharge, but on the afternoon of anticipated discharge da
y, the patient had an episode of chest pain.  Troponins were negative at that time.  The patient had 
a catheterization which showed no needs for stents.  Electrophysiology was consulted and the patient 
had an ablation procedure.  After the ablation procedure, the patient returned to a normal sinus rhyt
hm with heart rate in the 80s-90s.

 

DISPOSITION:  Stable.

 

DISCHARGE INSTRUCTIONS:

1.  Location:  Home.

2.  Diet:  Heart healthy.

3.  Activity:  As tolerated.

4.  Follow up PCP, Dr. Hudson, Dr. Jauregui, Dr. Brumfield.  Please follow up with EKG at time of patient's visi
t to confirm that he is still in sinus rhythm as the patient is being sent home without Multaq after 
the ablation.

## 2018-03-20 NOTE — PDOC.FM
- Subjective


Subjective: 





Patient had ablation done yesterday by Dr. Brumfield. Overnight remained in sinus 

rhythm. Patient denies any chest pain, sob, or pain at femoral site. Denies any 

nausea or vomiting after the procedure. 





- Objective


Vital Signs & Weight: 


 Vital Signs (12 hours)











  Temp Pulse Resp BP Pulse Ox


 


 03/20/18 03:45  97.7 F  90  18  154/92 H  96


 


 03/19/18 20:00  98.4 F  85  20  152/85 H  94 L








 Weight











Weight                         117.889 kg














I&O: 


 











 03/18/18 03/19/18 03/20/18





 06:59 06:59 06:59


 


Intake Total 1930 1690 1200


 


Output Total 1375 1900 1650


 


Balance 555 -459 -383











Result Diagrams: 


 03/19/18 05:04





 03/19/18 05:04





Phys Exam





- Physical Examination


Constitutional: NAD


HEENT: PERRLA, moist MMs


Neck: no nodes, full ROM


Respiratory: no wheezing, clear to auscultation bilateral


Cardiovascular: RRR


3/6 murmur


Gastrointestinal: soft, non-tender, no distention, positive bowel sounds


Musculoskeletal: no edema, pulses present


Neurological: non-focal, moves all 4 limbs


Psychiatric: normal affect, A&O x 3


Skin: no rash, cap refill <2 seconds


Deviation from normal: mild hematoma at right femoral site, no pain





Dx/Plan


(1) Elevated troponin


Code(s): R74.8 - ABNORMAL LEVELS OF OTHER SERUM ENZYMES   Status: Acute   





(2) HTN (hypertension)


Code(s): I10 - ESSENTIAL (PRIMARY) HYPERTENSION   Status: Acute   





(3) AAA (abdominal aortic aneurysm)


Code(s): I71.4 - ABDOMINAL AORTIC ANEURYSM, WITHOUT RUPTURE   Status: Acute   





(4) CLAY (obstructive sleep apnea)


Code(s): G47.33 - OBSTRUCTIVE SLEEP APNEA (ADULT) (PEDIATRIC)   Status: Acute   





(5) Tachycardia


Code(s): R00.0 - TACHYCARDIA, UNSPECIFIED   Status: Acute   





(6) Abnormal EKG


Code(s): R94.31 - ABNORMAL ELECTROCARDIOGRAM [ECG] [EKG]   Status: Acute   





- Plan


Plan: 





# Tachycardia, Aflutter vs Afib


- 2 doses Adenosine OSH


- rate in 70s on admit


- Stress test shows no irreversible changes


- cath negative 


- TSH, Mag WNL


- Discussed anticoagulation with Dr. Ben Coleman, plan for home on ASA and 

Eliquis when ready for d/c


- Cath last week by Dr. Jauregui, recommends EP eval





- A flutter over the weekend, despite dronedarone


- EP ablation 3/19


- Anticipate d/c later today after EP f/u and d/c recs





# Elevated troponin


- 0.15 -> 0.38 -> .603 -> .671 -> .785 -> .616


- .328-> .269 on 3/14


- CTA neg for PE at OSH


- No Chest pain over the weekend





# HTN


- home meds


- episodes of hypotension during hospitalization, continue to monitor BP, 

currently normotensive


- imdur, lasix, lisinopril 10mg after EP evaluation





# HLD


- high intesity statin





# recent AAA repair 2/15/18


- possible aortic widening on imaging per Dr. Jauregui recs CV surgery eval





# CLAY


- not on cpap





# Seasonal Allergies


- Mucinex


- Flonase


- daily Claritin





Dispo: anticipate d/c today pending EP recs

## 2018-03-20 NOTE — EKG
Test Reason : 

Blood Pressure : ***/*** mmHG

Vent. Rate : 085 BPM     Atrial Rate : 085 BPM

   P-R Int : 178 ms          QRS Dur : 138 ms

    QT Int : 400 ms       P-R-T Axes : 063 -06 177 degrees

   QTc Int : 476 ms

 

Normal sinus rhythm

Non-specific intra-ventricular conduction block

T wave abnormality, consider lateral ischemia

Abnormal ECG

 

Confirmed by DR. GIL PONCE MD ([id number]) on 3/20/2018 8:30:50 PM

 

Referred By:  ARLETTE           Confirmed By:DR. GIL PONCE MD

## 2018-03-21 NOTE — PRG
DATE OF SERVICE:  03/20/2018

 

ELECTROPHYSIOLOGY FOLLOWUP NOTE

 

SUBJECTIVE:  Mr. Mantilla has done well overnight and his recovery period after his atrial flutter ab
lation.  He has remained in sinus rhythm.  Denies any chest pain, shortness of breath, heart racing, 
or palpitations.  He denies any pain, bruising or bleeding problems at his bilateral femoral groin si
latoya.  He has been up walking in the [place], voiding normally and able to tolerate p.o. intake well.

 

OBJECTIVE:

GENERAL:  Patient is alert and oriented.

NEUROLOGIC:  Grossly intact and nonfocal.  His gait is stable.

NECK:  Supple without jugular venous distention.

HEENT:  His oral mucosa is moist and pink with adequate dentition.

HEART:  Rate is regularly regular, without murmur, rub or gallop.  His PMI is nondisplaced.

EXTREMITIES:  Warm and dry to touch without clubbing, cyanosis or edema.  His bilateral groin sites h
ave healed nicely and showed no signs of bleeding or hematoma formation.

LUNGS:  Clear to auscultation bilaterally.  Respirations are even and unlabored.

ABDOMEN:  Soft and nontender.

 

IMAGING DATA:  A 12-lead EKG performed this morning demonstrates normal sinus rhythm at a rate of 85 
beats per minute.  Intervals 192 milliseconds,  milliseconds and QTC is 473 milliseconds.

 

IMPRESSION:

1.  Cavotricuspid isthmus-dependent atrial flutter, status post cavotricuspid isthmus ablation on 03/
19/2018.  No documented recurrence on telemetry monitor.  The patient is maintaining sinus mechanism 
with rates well controlled.

2.  Elevated CHADS VASc score of 4 on the basis of age over 75, hypertension, and a history of vascul
ar disease.  The patient will continue Eliquis 5 mg b.i.d. for stroke prophylaxis without interruptio
n for at least one month post-ablation as he is at increased risk for stroke during this time.  The p
atient is okay to discontinue Multaq as he has maintained sinus rhythm overnight.  We see no evidence
 of recurrence of his arrhythmia.  We have not seen atrial fibrillation for this patient.  He will fo
llow up in 4-6 weeks as an outpatient for routine management, which will be arranged by our [facility] staff.  Mr. Mantilla is doing very well and is eagerly anticipating discharge to go home.

## 2018-04-11 ENCOUNTER — HOSPITAL ENCOUNTER (OUTPATIENT)
Dept: HOSPITAL 92 - CT | Age: 78
Discharge: HOME | End: 2018-04-11
Attending: THORACIC SURGERY (CARDIOTHORACIC VASCULAR SURGERY)
Payer: MEDICARE

## 2018-04-11 DIAGNOSIS — I71.4: Primary | ICD-10-CM

## 2018-04-11 DIAGNOSIS — Z95.828: ICD-10-CM

## 2018-04-11 DIAGNOSIS — N28.89: ICD-10-CM

## 2018-04-11 DIAGNOSIS — I25.10: ICD-10-CM

## 2018-04-11 LAB — ESTIMATED GFR-MDRD - POC: 72

## 2018-04-11 PROCEDURE — 74174 CTA ABD&PLVS W/CONTRAST: CPT

## 2018-04-11 PROCEDURE — 82565 ASSAY OF CREATININE: CPT

## 2018-04-11 NOTE — CT
CTA ABDOMEN AND PELVIS WITH AND WITHOUT CONTRAST:

 

HISTORY:

Abdominal aortic aneurysm status post stent graft placement.

 

COMPARISON:

01/10/2018

 

TECHNIQUE:

Multiple contiguous axial images were obtained in a CTA of the abdomen and pelvis with and without co
ntrast.  Three sagittal and coronal reformats were performed.

 

FINDINGS:

There is a stent graft spanning an abdominal aortic aneurysm, from an infrarenal location, down to th
e common iliac arteries.  This aneurysm measures 6.7 cm in maximum AP and 5.4 cm in maximum transvers
e dimensions, on image 84 of 210.  There are coils within an accessory left renal artery, which is se
en extending much more inferiorly to the more superiorly located main renal arteries.  The lower pole
 of the left kidney does not enhance as much as compared to the upper pole of the left kidney.  There
 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  This endoleak extends from the region of the takeoff
 of the renal artery and courses more anteriorly, in a linear fashion, to along the right aspect of t
he stent graft.  There does not appear to be separation of the stent graft, in the location where the
 type II endoleak extends superiorly.

 

The liver, gallbladder, adrenal glands, and spleen are unremarkable.  There is a 1.2 cm hypodensity i
n the head of the pancreas, which is nonspecific.

 

No abdominal or pelvic lymphadenopathy is seen.  The large and small bowel are unremarkable.  The tia
endix is unremarkable.

 

Degenerative changes are seen in the spine.  The visualized inferior thorax and abdominal wall soft t
issues are unremarkable.

 

IMPRESSION:

1.  The patient is status post stent graft repair of the abdominal aortic aneurysm.  There appears to
 be a type II endoleak, and the aneurysm appears slightly larger than on the prior examination.

2.  There are coils in an accessory left renal artery with lack of enhancement of the lower pole of t
he left kidney.

3.  Small hypodensity in the pancreatic body that could represent a cyst or cystic mass.  A follow-up
 exam in three months is recommended to ensure stability.

 

POS: [facility]KAS

## 2020-03-08 ENCOUNTER — HOSPITAL ENCOUNTER (INPATIENT)
Dept: HOSPITAL 92 - ERS | Age: 80
LOS: 2 days | Discharge: HOME | DRG: 281 | End: 2020-03-10
Attending: INTERNAL MEDICINE | Admitting: INTERNAL MEDICINE
Payer: MEDICARE

## 2020-03-08 VITALS — BODY MASS INDEX: 31.8 KG/M2

## 2020-03-08 DIAGNOSIS — I50.22: ICD-10-CM

## 2020-03-08 DIAGNOSIS — I25.10: ICD-10-CM

## 2020-03-08 DIAGNOSIS — Z79.02: ICD-10-CM

## 2020-03-08 DIAGNOSIS — E78.5: ICD-10-CM

## 2020-03-08 DIAGNOSIS — I25.5: ICD-10-CM

## 2020-03-08 DIAGNOSIS — E66.9: ICD-10-CM

## 2020-03-08 DIAGNOSIS — J30.2: ICD-10-CM

## 2020-03-08 DIAGNOSIS — I73.9: ICD-10-CM

## 2020-03-08 DIAGNOSIS — Z79.82: ICD-10-CM

## 2020-03-08 DIAGNOSIS — I11.0: ICD-10-CM

## 2020-03-08 DIAGNOSIS — Z95.5: ICD-10-CM

## 2020-03-08 DIAGNOSIS — G47.33: ICD-10-CM

## 2020-03-08 DIAGNOSIS — Z96.662: ICD-10-CM

## 2020-03-08 DIAGNOSIS — E87.6: ICD-10-CM

## 2020-03-08 DIAGNOSIS — Z79.899: ICD-10-CM

## 2020-03-08 DIAGNOSIS — I21.4: Primary | ICD-10-CM

## 2020-03-08 DIAGNOSIS — K21.9: ICD-10-CM

## 2020-03-08 DIAGNOSIS — Z95.1: ICD-10-CM

## 2020-03-08 DIAGNOSIS — Z96.651: ICD-10-CM

## 2020-03-08 PROCEDURE — 71275 CT ANGIOGRAPHY CHEST: CPT

## 2020-03-08 PROCEDURE — 93798 PHYS/QHP OP CAR RHAB W/ECG: CPT

## 2020-03-08 PROCEDURE — 93005 ELECTROCARDIOGRAM TRACING: CPT

## 2020-03-08 PROCEDURE — 80053 COMPREHEN METABOLIC PANEL: CPT

## 2020-03-08 PROCEDURE — 85025 COMPLETE CBC W/AUTO DIFF WBC: CPT

## 2020-03-08 PROCEDURE — 80061 LIPID PANEL: CPT

## 2020-03-08 PROCEDURE — 85379 FIBRIN DEGRADATION QUANT: CPT

## 2020-03-08 PROCEDURE — 99152 MOD SED SAME PHYS/QHP 5/>YRS: CPT

## 2020-03-08 PROCEDURE — 83735 ASSAY OF MAGNESIUM: CPT

## 2020-03-08 PROCEDURE — 96372 THER/PROPH/DIAG INJ SC/IM: CPT

## 2020-03-08 PROCEDURE — C1769 GUIDE WIRE: HCPCS

## 2020-03-08 PROCEDURE — 93306 TTE W/DOPPLER COMPLETE: CPT

## 2020-03-08 PROCEDURE — 36415 COLL VENOUS BLD VENIPUNCTURE: CPT

## 2020-03-08 PROCEDURE — 93459 L HRT ART/GRFT ANGIO: CPT

## 2020-03-08 PROCEDURE — 83880 ASSAY OF NATRIURETIC PEPTIDE: CPT

## 2020-03-08 PROCEDURE — 82553 CREATINE MB FRACTION: CPT

## 2020-03-08 PROCEDURE — 84484 ASSAY OF TROPONIN QUANT: CPT

## 2020-03-09 LAB
ALBUMIN SERPL BCG-MCNC: 3.6 G/DL (ref 3.4–4.8)
ALP SERPL-CCNC: 78 U/L (ref 40–110)
ALT SERPL W P-5'-P-CCNC: 12 U/L (ref 8–55)
ANION GAP SERPL CALC-SCNC: 10 MMOL/L (ref 10–20)
AST SERPL-CCNC: 16 U/L (ref 5–34)
BASOPHILS # BLD AUTO: 0 THOU/UL (ref 0–0.2)
BASOPHILS NFR BLD AUTO: 0.8 % (ref 0–1)
BILIRUB SERPL-MCNC: 1.5 MG/DL (ref 0.2–1.2)
BUN SERPL-MCNC: 18 MG/DL (ref 8.4–25.7)
CALCIUM SERPL-MCNC: 8.7 MG/DL (ref 7.8–10.44)
CHD RISK SERPL-RTO: 2.9 (ref ?–4.5)
CHLORIDE SERPL-SCNC: 106 MMOL/L (ref 98–107)
CHOLEST SERPL-MCNC: 107 MG/DL
CK MB SERPL-MCNC: 3.2 NG/ML (ref 0–6.6)
CK MB SERPL-MCNC: 3.9 NG/ML (ref 0–6.6)
CO2 SERPL-SCNC: 27 MMOL/L (ref 23–31)
COMM CRITICAL RESULTS DOC: (no result)
CREAT CL PREDICTED SERPL C-G-VRATE: 119 ML/MIN (ref 70–130)
EOSINOPHIL # BLD AUTO: 0.4 THOU/UL (ref 0–0.7)
EOSINOPHIL NFR BLD AUTO: 6 % (ref 0–10)
GLOBULIN SER CALC-MCNC: 2.2 G/DL (ref 2.4–3.5)
GLUCOSE SERPL-MCNC: 120 MG/DL (ref 83–110)
HDLC SERPL-MCNC: 37 MG/DL
HGB BLD-MCNC: 13.9 G/DL (ref 14–18)
LDLC SERPL CALC-MCNC: 53 MG/DL
LYMPHOCYTES # BLD: 2.1 THOU/UL (ref 1.2–3.4)
LYMPHOCYTES NFR BLD AUTO: 33.3 % (ref 21–51)
MCH RBC QN AUTO: 32.2 PG (ref 27–31)
MCV RBC AUTO: 95.3 FL (ref 78–98)
MONOCYTES # BLD AUTO: 0.6 THOU/UL (ref 0.11–0.59)
MONOCYTES NFR BLD AUTO: 8.7 % (ref 0–10)
NEUTROPHILS # BLD AUTO: 3.2 THOU/UL (ref 1.4–6.5)
NEUTROPHILS NFR BLD AUTO: 51.3 % (ref 42–75)
PLATELET # BLD AUTO: 141 THOU/UL (ref 130–400)
POTASSIUM SERPL-SCNC: 3.4 MMOL/L (ref 3.5–5.1)
RBC # BLD AUTO: 4.33 MILL/UL (ref 4.7–6.1)
SODIUM SERPL-SCNC: 140 MMOL/L (ref 136–145)
TRIGL SERPL-MCNC: 83 MG/DL (ref ?–150)
TROPONIN I SERPL DL<=0.01 NG/ML-MCNC: 0.45 NG/ML (ref ?–0.03)
WBC # BLD AUTO: 6.3 THOU/UL (ref 4.8–10.8)

## 2020-03-09 PROCEDURE — B2111ZZ FLUOROSCOPY OF MULTIPLE CORONARY ARTERIES USING LOW OSMOLAR CONTRAST: ICD-10-PCS | Performed by: INTERNAL MEDICINE

## 2020-03-09 PROCEDURE — B2151ZZ FLUOROSCOPY OF LEFT HEART USING LOW OSMOLAR CONTRAST: ICD-10-PCS | Performed by: INTERNAL MEDICINE

## 2020-03-09 PROCEDURE — 4A023N7 MEASUREMENT OF CARDIAC SAMPLING AND PRESSURE, LEFT HEART, PERCUTANEOUS APPROACH: ICD-10-PCS | Performed by: INTERNAL MEDICINE

## 2020-03-09 PROCEDURE — B2131ZZ FLUOROSCOPY OF MULTIPLE CORONARY ARTERY BYPASS GRAFTS USING LOW OSMOLAR CONTRAST: ICD-10-PCS | Performed by: INTERNAL MEDICINE

## 2020-03-09 RX ADMIN — DOCUSATE SODIUM 50 MG AND SENNOSIDES 8.6 MG SCH TAB: 8.6; 5 TABLET, FILM COATED ORAL at 20:35

## 2020-03-09 RX ADMIN — ISOSORBIDE MONONITRATE SCH MG: 30 TABLET, EXTENDED RELEASE ORAL at 09:12

## 2020-03-09 RX ADMIN — DOCUSATE SODIUM 50 MG AND SENNOSIDES 8.6 MG SCH TAB: 8.6; 5 TABLET, FILM COATED ORAL at 09:12

## 2020-03-09 RX ADMIN — Medication SCH MG: at 09:12

## 2020-03-09 RX ADMIN — ISOSORBIDE MONONITRATE SCH: 30 TABLET, EXTENDED RELEASE ORAL at 20:36

## 2020-03-09 RX ADMIN — ASPIRIN SCH MG: 81 TABLET ORAL at 09:18

## 2020-03-09 NOTE — CT
CT ANGIOGRAM WITH CONTRAST:

 

History: Chest pain. 

 

Comparison: None. 

 

FINDINGS: 

CT angiogram of the chest performed after the intravenous administration of contrast. 3D rendering pr
ovided. 

 

No proximal or segmental or pulmonary artery defect. Heart size is enlarged. No pericardial effusion.
 

 

No aortic aneurysm. No mediastinal adenopathy. 

 

Either contrast or stones in the left renal collecting system, incompletely evaluated. 

 

Lungs are hyperinflated. Atelectasis in both lung bases. No consolidation. No pneumothorax. No signif
icant effusion. 

 

There is no acute osseous abnormality. 

 

IMPRESSION: 

1. No pulmonary embolism. 

2. No evidence for pneumonia. 

3. Mild lung hypoinflation with vascular crowding and atelectatic changes. 

4. Retained contrast left renal collecting system, possibly even the right, incompletely evaluated. R
ecommend correlation with the recent contrast load. If the patient has not had recent contrast, abdom
en and pelvic CT may be beneficial to evaluate for a calculus, although none was seen on the 2018 deandre
dy. 

5. Mosaic attenuation of the lungs can be seen with chronic small vessel disease. 

 

 

POS: HOME

## 2020-03-09 NOTE — CON
DATE OF CONSULTATION:  03/09/2020



INDICATION FOR CONSULTATION:  A 79-year-old patient with history of known coronary

artery disease.  He has undergone a bypass surgery on 2 different occasions.  He has

also undergone previously angioplasty and stent placement.  He most recent cardiac

catheterization was in 2018, it was 2 years ago on 03/11/2018, which showed all

native vessels to be 100% occluded.  He did have a saphenous vein graft to the LAD

diagonal, which was a Y-graft, both of this arms remained patent.  He had no

significant distal disease.  The saphenous vein graft to the obtuse marginal branch

was patent.  He also had a saphenous vein graft to the right coronary artery

previously, which was 100% occluded.  Ejection fraction was 40% to 45% at that time.

 He has been treated medically.  He has been doing relatively well.  He was

scheduled to undergo a spine stimulator test to see whether or not he would benefit

from having an implantation of a pain stimulator in the spine due to his back pain

and he has stopped his Plavix about a week ago.  Yesterday, he was in the garage and

started to notice some pain between his shoulder blades.  He took nitroglycerin and

had some relief.  He then took a rest and then took a 2nd nitroglycerin and he was

still having some discomfort.  The pain was still present and he decided to go to

the emergency room.  On the way to the emergency room, he felt like he had

indigestion, needed to burp, and after he burped, he did have some relief, but still

had some discomfort.  When he arrived in the emergency room, he was evaluated and

was sent here for further evaluation and treatment.  His original troponin-I was

0.239, has increased up to 0.452, and is back down to 0.353.  His LDL level was 53.

He has no other significant problems.  This morning, he is relatively stable, but

still has some mild discomfort.  His EKG shows a normal sinus rhythm with some

T-wave inversions in I, aVL, V5, and V6, but no acute ST-segment changes were noted. 



Past medical history, please refer to the notes from 03/04/2020.  He was just seen

in the clinic on 03/04/2020 for evaluation.  He had a preoperative evaluation prior

to undergoing his planned stimulator implant.  His medications are listed.  Past

medical history is listed.  Review of systems present as well as his physical

examination.  There were no significant change in physical examination since I last

saw him. 



PHYSICAL EXAMINATION:

VITAL SIGNS:  His blood pressure is 135/77.  Heart rate is 53 and he does have some

sinus bradycardia, but no other problems.  He is afebrile.  Respiratory rate 18. 

HEENT:  Showed the head to be normocephalic and atraumatic.  Carotid pulses are

present. 

CHEST:  Clear to auscultation without rales, rhonchi, or wheezing. 

CARDIOVASCULAR:  Has a regular rhythm.  He has a systolic murmur at the apex about

3/6.  He has a history of mitral valve regurgitation and also tricuspid valve

regurgitation as well as some aortic valve sclerosis, but this has not been

significant. 

ABDOMEN:  Soft and nontender.  Positive bowel sounds are present.  He has some

obesity. 

EXTREMITIES:  No clubbing or cyanosis.  He has had a previous ankle surgery and does

have some decreased movement in the right ankle, otherwise he seems to be doing

relatively well.  Pulses are present. 

SKIN:  Dry. 

NEUROLOGIC:  He is fully intact.



LABORATORY DATA:  There is no CBC performed on the laboratory data, but his last

hemoglobin was 13.5 back in 2018.  His BUN was 11, creatinine 0.97, and blood sugar

was 167. 



IMPRESSION:  

1. Elderly gentleman with history of coronary artery disease, who developed more

chest pain after stopping his Plavix.  I suggest he undergo cardiac catheterization

for definitive evaluation of his coronary artery disease, especially in view of the

fact that he is planning upcoming surgery.  We will take the patient to the cath lab

today for evaluation.  He has not had any breakfast.  I have explained the procedure

and the risks to him to include bleeding, infection, possibly myocardial infarction,

CVA, renal insufficiency, allergic contrast reaction, even possibility of death.  He

understands and agrees to proceed we will plan for cardiac catheterization as soon

as he is able to get to the cath lab on schedule. 

2. History of hypertension.  This is relatively stable at this time.

3. History of cardiomyopathy.  Actually, his last ejection fraction in 01/2020

showed ejection fraction about 42% and by catheterization in 2018 ejection fraction

was 40% to 45%. 

4. He is status post abdominal aortic aneurysm repair, also this was in 2018.  This

was with an endo graft in 02/2018. 

5. History of hyperlipidemia.  He will continue his present medications.

6. Dyslipidemia.  He will continue his medicines for his cholesterol.

7. Elevated blood sugar.  He has not had any recent chemistries except for in the

past.  We will try to evaluate his chemistries.  Most recently, his last blood sugar

was 125.  This was performed on 01/09.  Further care of the patient will be

determined after we have re-evaluated his coronary arteries. 







Job ID:  [id number]

## 2020-03-09 NOTE — HP
PRIMARY CARDIOLOGIST:  Dr. Jauregui.



PRIMARY CARE PHYSICIAN:  [facility].



CHIEF COMPLAINT:  Chest discomfort.



HISTORY OF PRESENT ILLNESS:  The patient is a 79-year-old male with coronary artery

disease, status post CABG; hypertension; and obstructive sleep apnea, presented to

the emergency room at [facility] with chest discomfort.  He was

transferred to this facility for hospital admission. 



The chest discomfort started this evening while he was walking in the garage.  It

was radiating between his shoulder blades.  It was moderate in intensity.  He also

noticed that his heart rate was in 120s.  He had some belching; however, denies any

vomiting, syncope, or diaphoresis.  He denies any shortness of breath or lower

extremity edema.  No orthopnea or paroxysmal nocturnal dyspnea reported.  He

discontinued Plavix 5 days ago due to spinal stimulator surgery on the 16th of March.  No cough, fever, chills, or diarrhea reported. 



PAST MEDICAL HISTORY:  

1. Coronary artery disease, status post CABG.

2. Obstructive sleep apnea.

3. Hypertension.

4. GERD.

5. Hypertension.

6. Hyperlipidemia.

7. Degenerative joint disease.

8. Ischemic cardiomyopathy.

9. Obesity with a BMI of 31.9.

10. History of atrial flutter.

11. Peripheral vascular disease.

12. History of nonsustained ventricular tachycardia.



PAST SURGICAL HISTORY:  

1. CABG with a redo CABG.

2. Abdominal aortic repair in 2018.

3. Coronary stent placement.

4. Multiple cardiac catheterization.

5. Ablation for atrial flutter.

6. Left ankle replacement.

7. Right knee replacement.



SOCIAL HISTORY:  The patient currently lives at home with his family.  He is full

code.  He makes his own decision with the help of his family. 



FAMILY HISTORY:  Positive for heart disease.



CURRENT HOME MEDICATIONS:  

1. Aspirin 81 mg daily.

2. Amlodipine 5 mg at bedtime.

3. Vitamin C daily.

4. Lipitor 40 mg q.p.m.

5. Carvedilol 1.56 mg b.i.d.

6. Cetirizine 10 mg at bedtime.

7. Vitamin D at bedtime.

8. Clonidine 0.1 b.i.d.

9. Plavix 75 mg daily.  Please note that he discontinued Plavix 5 days ago.

10. Zetia 10 mg daily.

11. Folic acid 0.8 mg at bedtime.

12. Isosorbide mononitrate 30 mg b.i.d.

13. Entresto 2 tablets b.i.d.

14. Coenzyme Q10 100 mg q.p.m.

15. Sublingual nitroglycerin as needed.

16. Lasix 40 mg daily. 

Medications administered at [facility],

1. Aspirin 324 mg x1.

2. Clonidine 0.1.

3. Lipitor 40 mg x1.

4. Isosorbide mononitrate 30 mg.

5. Amlodipine 5 mg.

6. Cetirizine 10 mg.

7. Please note that the patient received Lovenox 1 mg/kg at this facility.



REVIEW OF SYSTEMS:  All other review of systems was reviewed and was found negative.



PHYSICAL EXAMINATION:

VITAL SIGNS:  Showed temperature 97.8, respirations of 16, pulse rate of 57, blood

pressure of 138/79, O2 saturations 97% on room air. 

GENERAL:  A 79-year-old male, in no apparent distress.  Denies any chest discomfort

at this time. 

HEENT:  Head, atraumatic and normocephalic.  Sclerae anicteric.  Moist mucous

membranes.  No oral lesion. 

NECK:  Supple.  No JVD appreciated.  No carotid bruit. 

LUNGS:  Clear to auscultation bilaterally with diminished air entry at bilateral

bases.  No wheezing or rales. 

HEART:  S1, S2 present.  Regular rate and rhythm.  No rubs or gallops. 

ABDOMEN:  Soft.  Obese.  Bowel sounds present.  No rebound or guarding.  No

costovertebral angle tenderness. 

EXTREMITIES:  No edema or calf tenderness. 

NEUROLOGIC:  Grossly nonfocal.  Moves all 4 extremities. 

PSYCHIATRIC:  Alert, awake, and oriented x3. 

SKIN:  Warm and dry. 

LYMPH NODES:  No palpable lymph nodes in the neck.



LABORATORY FINDINGS:  Troponin at [facility] was 0.02. 



Chemistries showed sodium 142, potassium 3.8, chloride 108, bicarb 25, BUN of 21,

creatinine 0.7, glucose of 123. 



Alkaline phosphatase 103, bilirubin 1.3, AST 15, ALT of 16. 



CBC showed WBC 7.2 with hemoglobin 14.5, hematocrit 42.8, and platelet of 150. 



Chest x-ray at [facility] was consistent with volume overload. 



EKG by my review showed sinus bradycardia with the lateral ischemic changes. 



Repeat troponin at this facility was 0.452 with BNP of 102.  Magnesium was normal.



IMPRESSION:  

1. Non-ST-elevation myocardial infarction.

2. Coronary artery disease, status post stent as well as coronary artery bypass

grafting. 

3. Hypertension.

4. Hyperlipidemia.

5. Abdominal aortic aneurysm repair.

6. Obstructive sleep apnea.

7. Seasonal allergy.

8. History of chronic systolic heart failure, on Entresto.

9. Obesity with a BMI of 31.9.

10. Gastroesophageal reflux disease.

11. Ischemic cardiomyopathy.

12. Hypertension.

13. Hyperlipidemia.



PLAN:  The patient will be monitored on the telemetry unit.  He received 1 mg/kg

Lovenox.  We will continue aspirin.  We will restart Plavix.  We will discuss with

Cardiology, a loading dose of Plavix is needed.  Recheck labs on a daily basis.  We

will keep him n.p.o. for possible cardiac catheterization.  Please resume Lovenox if

cardiac catheterization is not planned if okay with Cardiology.  We will continue

isosorbide mononitrate.  Continue low-dose beta blockers.  Resume Entresto.  The

patient understands the above plan of care. 







Job ID:  [id number]

## 2020-03-10 VITALS — SYSTOLIC BLOOD PRESSURE: 127 MMHG | DIASTOLIC BLOOD PRESSURE: 68 MMHG

## 2020-03-10 VITALS — TEMPERATURE: 97.7 F

## 2020-03-10 LAB
ALBUMIN SERPL BCG-MCNC: 3.8 G/DL (ref 3.4–4.8)
ALP SERPL-CCNC: 84 U/L (ref 40–110)
ALT SERPL W P-5'-P-CCNC: 14 U/L (ref 8–55)
ANION GAP SERPL CALC-SCNC: 14 MMOL/L (ref 10–20)
AST SERPL-CCNC: 25 U/L (ref 5–34)
BASOPHILS # BLD AUTO: 0 THOU/UL (ref 0–0.2)
BASOPHILS NFR BLD AUTO: 0.7 % (ref 0–1)
BILIRUB SERPL-MCNC: 1.2 MG/DL (ref 0.2–1.2)
BUN SERPL-MCNC: 17 MG/DL (ref 8.4–25.7)
CALCIUM SERPL-MCNC: 8.9 MG/DL (ref 7.8–10.44)
CHLORIDE SERPL-SCNC: 106 MMOL/L (ref 98–107)
CO2 SERPL-SCNC: 23 MMOL/L (ref 23–31)
CREAT CL PREDICTED SERPL C-G-VRATE: 95 ML/MIN (ref 70–130)
EOSINOPHIL # BLD AUTO: 0.3 THOU/UL (ref 0–0.7)
EOSINOPHIL NFR BLD AUTO: 4.6 % (ref 0–10)
GLOBULIN SER CALC-MCNC: 2.9 G/DL (ref 2.4–3.5)
GLUCOSE SERPL-MCNC: 121 MG/DL (ref 83–110)
HGB BLD-MCNC: 14.7 G/DL (ref 14–18)
LYMPHOCYTES # BLD: 1.6 THOU/UL (ref 1.2–3.4)
LYMPHOCYTES NFR BLD AUTO: 26.5 % (ref 21–51)
MCH RBC QN AUTO: 31.4 PG (ref 27–31)
MCV RBC AUTO: 94.1 FL (ref 78–98)
MONOCYTES # BLD AUTO: 0.5 THOU/UL (ref 0.11–0.59)
MONOCYTES NFR BLD AUTO: 7.8 % (ref 0–10)
NEUTROPHILS # BLD AUTO: 3.6 THOU/UL (ref 1.4–6.5)
NEUTROPHILS NFR BLD AUTO: 60.4 % (ref 42–75)
PLATELET # BLD AUTO: 160 THOU/UL (ref 130–400)
POTASSIUM SERPL-SCNC: 4.2 MMOL/L (ref 3.5–5.1)
RBC # BLD AUTO: 4.69 MILL/UL (ref 4.7–6.1)
SODIUM SERPL-SCNC: 139 MMOL/L (ref 136–145)
WBC # BLD AUTO: 6 THOU/UL (ref 4.8–10.8)

## 2020-03-10 RX ADMIN — ISOSORBIDE MONONITRATE SCH MG: 30 TABLET, EXTENDED RELEASE ORAL at 08:40

## 2020-03-10 RX ADMIN — Medication SCH MG: at 08:41

## 2020-03-10 RX ADMIN — DOCUSATE SODIUM 50 MG AND SENNOSIDES 8.6 MG SCH TAB: 8.6; 5 TABLET, FILM COATED ORAL at 08:40

## 2020-03-10 RX ADMIN — ASPIRIN SCH MG: 81 TABLET ORAL at 08:40

## 2020-03-10 NOTE — PDOC.CPN
- Subjective


Date: 03/10/20


Time: 08:00


Interval history: 





The pt seen and examined.  No overnight events. No cardiac complaints. 





- Objective


Allergies/Adverse Reactions: 


 Allergies











Allergy/AdvReac Type Severity Reaction Status Date / Time


 


hydrochlorothiazide Allergy   Verified 03/09/20 05:03











Vital Signs & Weight: 


 Vital Signs











  Temp Pulse Pulse Pulse Resp BP BP


 


 03/10/20 11:21   62    16  


 


 03/10/20 10:19    52 L  62    127/68


 


 03/10/20 08:40       133/65 


 


 03/10/20 08:00  97.7 F  54 L    18  


 


 03/10/20 04:00  97.4 F L  53 L    20  














  BP BP BP Pulse Ox Pulse Ox Pulse Ox


 


 03/10/20 11:21   138/63    


 


 03/10/20 10:19  138/63     96  96


 


 03/10/20 08:40      


 


 03/10/20 08:00    139/69  97  


 


 03/10/20 04:00   128/71   92 L  








 











Weight                         248 lb 6.4 oz

















- Physical Exam


General: alert & oriented x3


HEENT: mucus membranes moist


Neck: supple neck


Cardiac: regular rate and rhythm, S1/S2


Lungs: clear to auscultation


Neuro: cranial nerve 2-12 intact


Skin: other (Rt Fem site without any drainage or mass to palpitate)





- Labs


Result Diagrams: 


 03/10/20 04:04





 03/10/20 04:04


 Troponin/CKMB











CK-MB (CK-2)  3.9 ng/mL (0-6.6)   03/09/20  06:57    


 


Troponin I  0.353 ng/mL (< 0.028)  H*  03/09/20  06:57    














- Telemetry


Sinus rhythms and dysrhythmias: sinus rhythm





- Assessment/Plan


Assessment/Plan: 





1. CP - s/p C with patent grafts; cont. current med


2. CAD with hx of CABG - on Coreg, ASA, and statin; 


3. Chronic systolic HF - stable with coreg, Entresto, and Lasix; 


4. Typical Aflutter with hx of CTI ablation in 2018 - remains in SR; on Coreg 

and ASA 81mg qd


MAR reviewed





* LHC on 03/09/2020 with patent grafts with EF 45-50%, hypokinetic inferior 

wall and apex

## 2020-03-11 NOTE — DIS
DATE OF ADMISSION:  03/09/2020



DATE OF DISCHARGE:  03/10/2020



PRIMARY CARE PROVIDER:  Dr. Bipin Hudson.



DISCHARGE DIAGNOSES:  

1. Non-ST-elevation myocardial infarction.

2. Hypokalemia.



CONDITION OF PATIENT ON THE DAY OF DISCHARGE:  Stable.  I assessed Mr. Mantilla on

the day of discharge.  He denies any chest pain or shortness of breath.  Vital signs

are stable.  S1 and S2 are heard, regular.  Lungs are clear to auscultation

bilaterally. 



CONSULTATIONS DURING THIS HOSPITALIZATION:  Cardiology, Dr. Jauregui.



DISCHARGE MEDICATIONS:  No change was made to his pre-admission home medications as

dictated by Dr. Mccormick in his history and physical note dated 03/09/2020. 



HOSPITAL COURSE:  Mr. Mantilla is a pleasant 79-year-old gentleman who was admitted

to [facility] on 03/09/2020, for non-ST-elevation myocardial

infarction.  Please refer to Dr. Mccormick's history and physical note dated 03/09/2020,

for further details.  He was seen by Cardiology Service.  He underwent coronary

angiography.  All native vessels are 100% occluded.  He had mildly impaired

ventricular function with EF of 45% to 50%, inferior and apical hypokinesis.  SVG to

RCA was occluded in prior catheterization.  SVG to LAD was patent and SVG to OM was

patent.  He has been recommended medical therapy including statin, beta blocker, and

ACE inhibitor.  He has been advised to proceed with proposed surgery as planned. 



Please note, his Plavix continues to be on hold until it is approved by his surgeon. 



The patient improved dramatically in terms of his symptoms following admission.  He

is being discharged home on 03/10/2020. 



POST ACUTE CARE FOLLOWUP:  With primary care provider in 3 days and with Cardiology,

Dr. Jauregui in 3 to 4 weeks. 



ACTIVITY:  As tolerated.



DIET:  Heart healthy and low-sodium.



DISCHARGE DESTINATION:  Home.



TIME SPENT:  Total amount of time spent coordinating this discharge:  31 minutes.







Job ID:  [id number]